# Patient Record
Sex: MALE | Race: WHITE | NOT HISPANIC OR LATINO | Employment: OTHER | URBAN - METROPOLITAN AREA
[De-identification: names, ages, dates, MRNs, and addresses within clinical notes are randomized per-mention and may not be internally consistent; named-entity substitution may affect disease eponyms.]

---

## 2017-06-09 ENCOUNTER — APPOINTMENT (OUTPATIENT)
Dept: GENERAL RADIOLOGY | Facility: HOSPITAL | Age: 52
End: 2017-06-09

## 2017-06-09 ENCOUNTER — HOSPITAL ENCOUNTER (EMERGENCY)
Facility: HOSPITAL | Age: 52
Discharge: HOME | End: 2017-06-09
Attending: EMERGENCY MEDICINE | Admitting: EMERGENCY MEDICINE

## 2017-06-09 VITALS
WEIGHT: 186 LBS | RESPIRATION RATE: 16 BRPM | HEART RATE: 83 BPM | OXYGEN SATURATION: 100 % | DIASTOLIC BLOOD PRESSURE: 74 MMHG | BODY MASS INDEX: 26.63 KG/M2 | HEIGHT: 70 IN | SYSTOLIC BLOOD PRESSURE: 135 MMHG | TEMPERATURE: 98.3 F

## 2017-06-09 DIAGNOSIS — S40.012A CONTUSION SHOULDER/ARM, LEFT, INITIAL ENCOUNTER: ICD-10-CM

## 2017-06-09 DIAGNOSIS — IMO0001 ELEVATED BLOOD PRESSURE: ICD-10-CM

## 2017-06-09 DIAGNOSIS — S60.212A CONTUSION OF WRIST, LEFT: ICD-10-CM
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
## 2017-06-09 DIAGNOSIS — S46.812A SUPRASPINATUS SPRAIN, LEFT, INITIAL ENCOUNTER: ICD-10-CM

## 2017-06-09 DIAGNOSIS — S40.022A CONTUSION SHOULDER/ARM, LEFT, INITIAL ENCOUNTER: ICD-10-CM

## 2017-06-09 PROCEDURE — 73030 X-RAY EXAM OF SHOULDER: CPT

## 2017-06-09 PROCEDURE — 99283 EMERGENCY DEPT VISIT LOW MDM: CPT

## 2017-06-09 PROCEDURE — 73110 X-RAY EXAM OF WRIST: CPT

## 2017-06-09 RX ORDER — TRAMADOL HYDROCHLORIDE 50 MG/1
100 TABLET ORAL ONCE
Status: DISCONTINUED | OUTPATIENT
Start: 2017-06-09 | End: 2017-06-10 | Stop reason: HOSPADM

## 2017-06-09 RX ORDER — NAPROXEN 250 MG/1
500 TABLET ORAL ONCE
Status: COMPLETED | OUTPATIENT
Start: 2017-06-09 | End: 2017-06-09

## 2017-06-09 RX ORDER — TRAMADOL HYDROCHLORIDE 50 MG/1
50 TABLET ORAL EVERY 4 HOURS PRN
Qty: 20 TABLET | Refills: 0 | Status: SHIPPED | OUTPATIENT
Start: 2017-06-09 | End: 2018-08-20

## 2017-06-09 RX ORDER — NAPROXEN 500 MG/1
500 TABLET ORAL 2 TIMES DAILY WITH MEALS
Qty: 14 TABLET | Refills: 0 | Status: SHIPPED | OUTPATIENT
Start: 2017-06-09 | End: 2018-08-20

## 2017-06-09 RX ADMIN — NAPROXEN 500 MG: 250 TABLET ORAL at 22:42

## 2017-06-10 NOTE — ED PROVIDER NOTES
Subjective   HPI Comments: Stan Nguyễn is a 52 y.o. male with a hx of DM who presents to the ED s/p fall. Tonight he was riding a hover board when it went out from under him, causing him to fall back. He caught himself with his outstretched left arm and has since had left wrist and shoulder pain. He denies any LOC but directly after he felt lightheaded and had to sit for 20 minutes. He denies any headache, back or neck pain, or any other symptoms. He reports nothing else acute at this time.     Patient is a 52 y.o. male presenting with fall.   History provided by:  Patient  Fall   Mechanism of injury: fall    Injury location:  Shoulder/arm  Shoulder/arm injury location:  L shoulder and L wrist  Incident location:  Home  Arrived directly from scene: no    Fall:     Fall occurred: hoverboard.    Impact surface:  Ball    Point of impact: outstretched left arm.    Entrapped after fall: no    Suspicion of alcohol use: no    Suspicion of drug use: no    Prior to arrival data:     Bystander interventions:  None    Patient ambulatory at scene: yes      Blood loss:  None    Responsiveness at scene:  Alert    Orientation at scene:  Person, place, situation and time    Loss of consciousness: no      Amnesic to event: no    Associated symptoms: no back pain, no loss of consciousness and no neck pain        Review of Systems   Musculoskeletal: Negative for back pain and neck pain.        Left shoulder and wrist pain.   Neurological: Negative for loss of consciousness.       History reviewed. No pertinent past medical history.    No Known Allergies    No past surgical history on file.    History reviewed. No pertinent family history.    Social History     Social History   • Marital status:      Spouse name: N/A   • Number of children: N/A   • Years of education: N/A     Social History Main Topics   • Smoking status: None   • Smokeless tobacco: None   • Alcohol use None   • Drug use: None   • Sexual activity: Not  Asked     Other Topics Concern   • None     Social History Narrative   • None         Objective   Physical Exam   Constitutional: He is oriented to person, place, and time. He appears well-developed and well-nourished. No distress.   HENT:   Head: Normocephalic and atraumatic.   Nose: Nose normal.   Eyes: Conjunctivae and EOM are normal. No scleral icterus.   Neck: Normal range of motion. Neck supple.   Cardiovascular: Normal rate, regular rhythm, normal heart sounds and intact distal pulses.    Pulses:       Radial pulses are 2+ on the right side, and 2+ on the left side.   Pulmonary/Chest: Effort normal and breath sounds normal. No respiratory distress. He has no wheezes.   Musculoskeletal: Normal range of motion. He exhibits tenderness. He exhibits no edema.   No edema or erythema to the left shoulder, upper arm, or elbow. Mild edema to the first and fifth metacarpal area of the palmar aspect of the left hand. Moderate tenderness to the anterior aspect of the humeral head. No supraclavicular or scapular tenderness. No thoracic or cervical tenderness.  Mild left wrist tenderness. Diffuse tenderness to the palmar aspect of the left hand. Passive ROM of the LUE unable to be preformed secondary to pain.    In terms of the shoulder, the area of maximum tenderness is in the region of the supraspinatus muscle.  There is no palpable deformity or evidence of dislocation on exam.  Patient is neurovascularly intact.   Neurological: He is alert and oriented to person, place, and time.   Motor and sensory function intact and equal bilaterally.    Skin: Skin is warm and dry. He is not diaphoretic.   Psychiatric: He has a normal mood and affect. His behavior is normal.   Nursing note and vitals reviewed.      Procedures         ED Course  ED Course   Comment By Time   Neck no obvious acute fractures or dislocations noted.  Findings consistent with fall with contusions and sprain.  I did advise these are preliminary reports and  "the radiologist will have a final read tomorrow and we will call if there is any significant findings.  We will discharge the patient was symptomatically management and sling to follow-up with orthopedics in one week if any symptoms persist.  The patient was understanding and agrees. Conrad Sanford MD 06/09 2230                 No results found for this or any previous visit (from the past 24 hour(s)).  Note: In addition to lab results from this visit, the labs listed above may include labs taken at another facility or during a different encounter within the last 24 hours. Please correlate lab times with ED admission and discharge times for further clarification of the services performed during this visit.    XR Wrist 3+ View Left   ED Interpretation   No obvious displaced fracture or dislocation noted on 3 views of   the wrist.      XR Shoulder 2+ View Left   ED Interpretation   No obvious displaced acute fracture or dislocation.  2 views left   shoulder.        Vitals:    06/09/17 2132   BP: 135/74   Pulse: 83   Resp: 16   Temp: 98.3 °F (36.8 °C)   SpO2: 100%   Weight: 186 lb (84.4 kg)   Height: 70\" (177.8 cm)     Medications   traMADol (ULTRAM) tablet 100 mg (100 mg Oral Not Given 6/9/17 2245)   naproxen (NAPROSYN) tablet 500 mg (500 mg Oral Given 6/9/17 2242)     ECG/EMG Results (last 24 hours)     ** No results found for the last 24 hours. **            MDM  Number of Diagnoses or Management Options  Contusion of wrist, left:   Contusion shoulder/arm, left, initial encounter:   Elevated blood pressure:   Fall (on)(from) sidewalk curb, initial encounter:      Amount and/or Complexity of Data Reviewed  Tests in the radiology section of CPT®: reviewed  Independent visualization of images, tracings, or specimens: yes        Final diagnoses:   Fall (on)(from) sidewalk curb, initial encounter   Contusion shoulder/arm, left, initial encounter   Contusion of wrist, left   Elevated blood pressure   Supraspinatus " sprain, left, initial encounter       Documentation assistance provided by osiel Dorsey.  Information recorded by the scribe was done at my direction and has been verified and validated by me.     Samantha Dorsey  06/09/17 5532       Conrad Sanford MD  06/09/17 6070

## 2017-10-30 ENCOUNTER — TRANSCRIBE ORDERS (OUTPATIENT)
Dept: ADMINISTRATIVE | Facility: HOSPITAL | Age: 52
End: 2017-10-30

## 2017-10-30 ENCOUNTER — HOSPITAL ENCOUNTER (OUTPATIENT)
Dept: GENERAL RADIOLOGY | Facility: HOSPITAL | Age: 52
Discharge: HOME | End: 2017-10-30
Attending: ORTHOPAEDIC SURGERY | Admitting: ORTHOPAEDIC SURGERY

## 2017-10-30 ENCOUNTER — APPOINTMENT (OUTPATIENT)
Dept: LAB | Facility: HOSPITAL | Age: 52
End: 2017-10-30

## 2017-10-30 ENCOUNTER — TRANSCRIBE ORDERS (OUTPATIENT)
Dept: LAB | Facility: HOSPITAL | Age: 52
End: 2017-10-30

## 2017-10-30 DIAGNOSIS — R73.09 OTHER ABNORMAL GLUCOSE: ICD-10-CM

## 2017-10-30 DIAGNOSIS — Z01.818 PRE-OP EXAMINATION: Primary | ICD-10-CM

## 2017-10-30 DIAGNOSIS — Z01.818 PREOP EXAMINATION: Primary | ICD-10-CM

## 2017-10-30 DIAGNOSIS — Z87.68 PERSONAL HISTORY OF PERINATAL PROBLEMS: ICD-10-CM

## 2017-10-30 LAB
ANION GAP SERPL CALCULATED.3IONS-SCNC: 13 MMOL/L (ref 3–11)
BUN BLD-MCNC: 15 MG/DL (ref 9–23)
BUN/CREAT SERPL: 15 (ref 7–25)
CALCIUM SPEC-SCNC: 9.1 MG/DL (ref 8.7–10.4)
CHLORIDE SERPL-SCNC: 105 MMOL/L (ref 99–109)
CO2 SERPL-SCNC: 25 MMOL/L (ref 20–31)
CREAT BLD-MCNC: 1 MG/DL (ref 0.6–1.3)
DEPRECATED RDW RBC AUTO: 45.7 FL (ref 37–54)
ERYTHROCYTE [DISTWIDTH] IN BLOOD BY AUTOMATED COUNT: 13.5 % (ref 11.3–14.5)
GFR SERPL CREATININE-BSD FRML MDRD: 78 ML/MIN/1.73
GLUCOSE BLD-MCNC: 61 MG/DL (ref 70–100)
HBA1C MFR BLD: 5.5 % (ref 4.8–5.6)
HCT VFR BLD AUTO: 43.4 % (ref 38.9–50.9)
HGB BLD-MCNC: 14 G/DL (ref 13.1–17.5)
MCH RBC QN AUTO: 29.9 PG (ref 27–31)
MCHC RBC AUTO-ENTMCNC: 32.3 G/DL (ref 32–36)
MCV RBC AUTO: 92.5 FL (ref 80–99)
PLATELET # BLD AUTO: 209 10*3/MM3 (ref 150–450)
PMV BLD AUTO: 10.8 FL (ref 6–12)
POTASSIUM BLD-SCNC: 3.6 MMOL/L (ref 3.5–5.5)
RBC # BLD AUTO: 4.69 10*6/MM3 (ref 4.2–5.76)
SODIUM BLD-SCNC: 143 MMOL/L (ref 132–146)
WBC NRBC COR # BLD: 6.92 10*3/MM3 (ref 3.5–10.8)

## 2017-10-30 PROCEDURE — 85027 COMPLETE CBC AUTOMATED: CPT | Performed by: ORTHOPAEDIC SURGERY

## 2017-10-30 PROCEDURE — 93010 ELECTROCARDIOGRAM REPORT: CPT | Performed by: INTERNAL MEDICINE

## 2017-10-30 PROCEDURE — 71020 HC CHEST PA AND LATERAL: CPT

## 2017-10-30 PROCEDURE — 36415 COLL VENOUS BLD VENIPUNCTURE: CPT | Performed by: ORTHOPAEDIC SURGERY

## 2017-10-30 PROCEDURE — 83036 HEMOGLOBIN GLYCOSYLATED A1C: CPT | Performed by: ORTHOPAEDIC SURGERY

## 2017-10-30 PROCEDURE — 80048 BASIC METABOLIC PNL TOTAL CA: CPT | Performed by: ORTHOPAEDIC SURGERY

## 2017-10-30 PROCEDURE — 93005 ELECTROCARDIOGRAM TRACING: CPT | Performed by: ORTHOPAEDIC SURGERY

## 2017-12-21 ENCOUNTER — TREATMENT (OUTPATIENT)
Dept: PHYSICAL THERAPY | Facility: CLINIC | Age: 52
End: 2017-12-21

## 2017-12-21 ENCOUNTER — TRANSCRIBE ORDERS (OUTPATIENT)
Dept: PHYSICAL THERAPY | Facility: CLINIC | Age: 52
End: 2017-12-21

## 2017-12-21 DIAGNOSIS — S46.219A BICEPS TENDON TEAR: ICD-10-CM

## 2017-12-21 DIAGNOSIS — M75.102 TEAR OF LEFT ROTATOR CUFF, UNSPECIFIED TEAR EXTENT: Primary | ICD-10-CM

## 2017-12-21 DIAGNOSIS — M75.122 COMPLETE ROTATOR CUFF TEAR OF LEFT SHOULDER: Primary | ICD-10-CM

## 2017-12-21 DIAGNOSIS — Z47.89 ORTHOPEDIC AFTERCARE: ICD-10-CM

## 2017-12-21 DIAGNOSIS — M25.512 LEFT SHOULDER PAIN, UNSPECIFIED CHRONICITY: ICD-10-CM

## 2017-12-21 PROCEDURE — PTSP1 PR CUSTOM PT EVALUTATION & TREATMENT OF SELF-PAY PT 1ST VISIT: Performed by: PHYSICAL THERAPIST

## 2017-12-21 NOTE — PROGRESS NOTES
Physical Therapy Initial Evaluation and Plan of Care        Subjective Evaluation    History of Present Illness  Date of surgery: 2017  Mechanism of injury: Years of lifting in the gym caused some shoulder pain.  He fell from a mcmahon board on an extended left shoulder.  This resulted in a tear of bicep and large tears in the left RTC.  He was in sling/immobilizers until this week, allowed to start weaning out of it.    OTHER: Active in tennis, kayaking, mountain biking, lifting     Subjective comment: Left Shoulder pain  Patient Occupation: Psycologist Quality of life: good    Pain  Current pain ratin  At best pain ratin  At worst pain ratin  Location: Intermittent left shoulder pain to elbow.  Quality: dull ache, throbbing, tight, squeezing and sharp  Relieving factors: rest, support and ice  Aggravating factors: overhead activity and movement  Progression: improved    Hand dominance: right             Objective       Postural Observations  Seated posture: good  Standing posture: good        Observations   Left Shoulder   Positive for adhesive scar and atrophy.     Additional Observation Details  Mild to moderate left scapular winging.  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    Tenderness     Left Shoulder   Tenderness in the acromion and biceps tendon (proximal).     Cervical/Thoracic Screen   Cervical range of motion within normal limits  Thoracic range of motion within normal limits    Neurological Testing   Sensation     Shoulder   Left Shoulder   Intact: light touch, pin prick and sharp/dull discrimination    Active Range of Motion   Left Shoulder   Flexion: 76 degrees   Extension: 36 degrees   Abduction: 82 degrees     Passive Range of Motion   Left Shoulder   Flexion: 85 degrees   Abduction: 90 degrees   External rotation 90°: 25 degrees   Internal rotation 90°: 35 degrees     Joint Play   Left Shoulder  Hypomobile in the anterior capsule, posterior capsule and  inferior capsule.    Strength/Myotome Testing     Left Shoulder     Planes of Motion   Flexion: 2+   Abduction: 3-   External rotation at 0°: 3   Internal rotation at 0°: 4-     Isolated Muscles   Biceps: 4   Triceps: 5          Assessment & Plan     Assessment  Impairments: abnormal muscle tone, abnormal or restricted ROM, activity intolerance, impaired physical strength, lacks appropriate home exercise program and pain with function  Assessment details: 54 Year old male who is S/P left RTC repair of a large tear and bicep tenodesis on 11/06/2017 from a fall in June 2017.  He is doing well post surgically.  Neurological exam is normal today.  Problems: pain, decreased ROM, decreased strength, decreased function, and severe shoulder girdle atrophy.  Prognosis: good  Prognosis details:   GOALS  STG to be met in 4 weeks  1.  Pt reports highest pain rating of 5/10  2.  Increase PROM of shoulder flexion to 145 degrees.  3.  Increase PROM of shoulder external rotation to 60 degrees.  4.  Pt is able to stabilize the left shoulder girdle to prevent winging.  LTG to be met in 12 weeks  1.  Pt is independent with HEP.  2.  Pt has full functional AROM of left shoulder in all planes.  3.  Pt is able to sleep through the night without being awaken by pain.  4.  Pt left shoulder girdle strength is functionally at 4+/5    Functional Limitations: carrying objects, lifting, sleeping, pulling, pushing, reaching behind back, reaching overhead and unable to perform repetitive tasks  Plan  Therapy options: will be seen for skilled physical therapy services  Planned modality interventions: cryotherapy, high voltage pulsed current (pain management), high voltage pulsed current (spasm management), iontophoresis and ultrasound  Planned therapy interventions: abdominal trunk stabilization, manual therapy, neuromuscular re-education, body mechanics training, flexibility, functional ROM exercises, home exercise program, joint mobilization,  therapeutic activities, stretching, strengthening, soft tissue mobilization and postural training  Frequency: 2x week  Duration in weeks: 12        Manual Therapy:    10     mins  10430;  Therapeutic Exercise:    10     mins  92950;     Neuromuscular Annie:        mins  73089;    Therapeutic Activity:          mins  99664;     Gait Training:          mins  26605;     Ultrasound:          mins  17613;    Electrical Stimulation:         mins  30413 ( );  Dry Needling          mins self-pay    Timed Treatment:   20   mins   Total Treatment:     40   mins    PT SIGNATURE: Manav Dickens, PT   DATE TREATMENT INITIATED: 12/21/2017    Initial Certification  Certification Period: 3/21/2018  I certify that the therapy services are furnished while this patient is under my care.  The services outlined above are required by this patient, and will be reviewed every 90 days.     PHYSICIAN: Benoit Del Cid MD      DATE:     Please sign and return via fax to [phone]. Thank you, [facility].

## 2017-12-26 ENCOUNTER — TREATMENT (OUTPATIENT)
Dept: PHYSICAL THERAPY | Facility: CLINIC | Age: 52
End: 2017-12-26

## 2017-12-26 DIAGNOSIS — Z47.89 ORTHOPEDIC AFTERCARE: ICD-10-CM

## 2017-12-26 DIAGNOSIS — M25.512 LEFT SHOULDER PAIN, UNSPECIFIED CHRONICITY: ICD-10-CM

## 2017-12-26 DIAGNOSIS — M75.102 TEAR OF LEFT ROTATOR CUFF, UNSPECIFIED TEAR EXTENT: Primary | ICD-10-CM

## 2017-12-26 DIAGNOSIS — S46.219A BICEPS TENDON TEAR: ICD-10-CM

## 2017-12-26 PROCEDURE — PTSPVT PR CUSTOM PT TREATMENT OF SELF PAY PATIENT: Performed by: PHYSICAL THERAPIST

## 2017-12-27 NOTE — PROGRESS NOTES
Subjective   Irving Nguyễn reports: HEP seems to be helping some    Objective   PROM LEFT SHOULDER:  Flexion 135, Abduction 110, ER 55, IR 50  OBSERVATION:  Atrophy left GH joint  See Exercise, Manual, and Modality Logs for complete treatment.       Assessment/Plan  Improvement in PROM.  Pain seems to be decreased.  Progress per Plan of Care           Manual Therapy:    20     mins  03069;  Therapeutic Exercise:    25     mins  88218;     Neuromuscular Annie:        mins  35307;    Therapeutic Activity:          mins  70657;     Gait Training:           mins  08245;     Ultrasound:          mins  23377;   Iontophoresis          mins  57298   Electrical Stimulation:    20     mins  46203 ( );  Dry Needling          mins self-pay  Fluidotherapy          mins 63709    Timed Treatment:   45   mins   Total Treatment:     65   mins    Manav Dickens, PT  Physical Therapist

## 2018-01-02 ENCOUNTER — TREATMENT (OUTPATIENT)
Dept: PHYSICAL THERAPY | Facility: CLINIC | Age: 53
End: 2018-01-02

## 2018-01-02 DIAGNOSIS — M75.102 TEAR OF LEFT ROTATOR CUFF, UNSPECIFIED TEAR EXTENT: Primary | ICD-10-CM

## 2018-01-02 DIAGNOSIS — Z47.89 ORTHOPEDIC AFTERCARE: ICD-10-CM

## 2018-01-02 DIAGNOSIS — M25.512 LEFT SHOULDER PAIN, UNSPECIFIED CHRONICITY: ICD-10-CM

## 2018-01-02 DIAGNOSIS — S46.219A BICEPS TENDON TEAR: ICD-10-CM

## 2018-01-02 PROCEDURE — PTSPVT PR CUSTOM PT TREATMENT OF SELF PAY PATIENT: Performed by: PHYSICAL THERAPIST

## 2018-01-02 NOTE — PROGRESS NOTES
Physical Therapy Daily Progress Note        Patient: Irving Nguyễn II   : 1965  Diagnosis/ICD-10 Code:  Tear of left rotator cuff, unspecified tear extent [M75.102]  Referring practitioner: Benoit Del Cid MD  Date of Initial Visit: Type: THERAPY  Noted: 2017  Today's Date: 2018  Patient seen for 3 sessions           Subjective   Irving Nguyễn reports: Shoulder is feeling some better.  Working on HEP about 45 minutes a day.    Objective       Observations   Left Shoulder   Positive for atrophy.     Active Range of Motion   Left Shoulder   Flexion: 105 degrees with pain  Extension: 50 degrees   Abduction: 94 degrees with pain    Passive Range of Motion   Left Shoulder   Flexion: 145 degrees   Abduction: 120 degrees   External rotation 90°: 45 degrees   Internal rotation 90°: 40 degrees        See Exercise, Manual, and Modality Logs for complete treatment.       Assessment/Plan  AROM and PROM is improving.  Still very weak.  Progress per Plan of Care and Progress strengthening /stabilization /functional activity           Manual Therapy:    30     mins  33346;  Therapeutic Exercise:    10     mins  44221;     Neuromuscular Annie:        mins  94656;    Therapeutic Activity:          mins  36410;     Gait Training:           mins  60432;     Ultrasound:          mins  01941;    Electrical Stimulation:    20     mins  52762 ( );  Fluidotherapy:          mins  58504  Traction:          mins  33195  Dry Needling          mins self-pay    Timed Treatment:   40   mins   Total Treatment:     60   mins    Manav Dickens PT  Physical Therapist

## 2018-01-09 ENCOUNTER — TREATMENT (OUTPATIENT)
Dept: PHYSICAL THERAPY | Facility: CLINIC | Age: 53
End: 2018-01-09

## 2018-01-09 DIAGNOSIS — S46.219A BICEPS TENDON TEAR: ICD-10-CM

## 2018-01-09 DIAGNOSIS — M75.102 TEAR OF LEFT ROTATOR CUFF, UNSPECIFIED TEAR EXTENT: Primary | ICD-10-CM

## 2018-01-09 DIAGNOSIS — M25.512 LEFT SHOULDER PAIN, UNSPECIFIED CHRONICITY: ICD-10-CM

## 2018-01-09 DIAGNOSIS — Z47.89 ORTHOPEDIC AFTERCARE: ICD-10-CM

## 2018-01-09 PROCEDURE — PTSPVT PR CUSTOM PT TREATMENT OF SELF PAY PATIENT: Performed by: PHYSICAL THERAPIST

## 2018-01-12 NOTE — PROGRESS NOTES
Physical Therapy Daily Progress Note        Patient: Irving Nguyễn II   : 1965  Diagnosis/ICD-10 Code:  Tear of left rotator cuff, unspecified tear extent [M75.102]  Referring practitioner: Benoit Del Cid MD  Date of Initial Visit: Type: THERAPY  Noted: 2017  Today's Date: 2018  Patient seen for 4 sessions           Subjective   Irving Nguyễn reports: Shoulder is feeling some better.  Breaking up HEP though out the day has been helpful    Objective       Observations   Left Shoulder   Positive for atrophy.     Active Range of Motion   Left Shoulder   Flexion: 105 degrees with pain  Extension: 50 degrees   Abduction: 94 degrees with pain    Passive Range of Motion   Left Shoulder   Flexion: 145 degrees   Abduction: 120 degrees   External rotation 90°: 45 degrees   Internal rotation 90°: 40 degrees        See Exercise, Manual, and Modality Logs for complete treatment.       Assessment/Plan  AROM and PROM is improving.  Still very weak.  Progress per Plan of Care and Progress strengthening /stabilization /functional activity           Manual Therapy:    20     mins  79241;  Therapeutic Exercise:    10     mins  83946;     Neuromuscular Annie:        mins  36657;    Therapeutic Activity:          mins  92219;     Gait Training:           mins  67641;     Ultrasound:          mins  18996;    Electrical Stimulation:    20     mins  67998 ( );  Fluidotherapy:          mins  23607  Traction:          mins  54143  Dry Needling          mins self-pay    Timed Treatment:   30   mins   Total Treatment:     50   mins    Manav Dickens PT  Physical Therapist

## 2018-01-16 ENCOUNTER — TREATMENT (OUTPATIENT)
Dept: PHYSICAL THERAPY | Facility: CLINIC | Age: 53
End: 2018-01-16

## 2018-01-16 DIAGNOSIS — M25.512 LEFT SHOULDER PAIN, UNSPECIFIED CHRONICITY: ICD-10-CM

## 2018-01-16 DIAGNOSIS — Z47.89 ORTHOPEDIC AFTERCARE: ICD-10-CM

## 2018-01-16 DIAGNOSIS — S46.219A BICEPS TENDON TEAR: ICD-10-CM

## 2018-01-16 DIAGNOSIS — M75.102 TEAR OF LEFT ROTATOR CUFF, UNSPECIFIED TEAR EXTENT: Primary | ICD-10-CM

## 2018-01-16 PROCEDURE — PTSPVT PR CUSTOM PT TREATMENT OF SELF PAY PATIENT: Performed by: PHYSICAL THERAPIST

## 2018-01-17 NOTE — PROGRESS NOTES
Subjective   Irving Nguyễn reports: Breaking up the HEP through out the day has helped a lot. Resting better    Objective   PROM: Left shoulder flexion 150, abduction 145, ER 55 ERP    See Exercise, Manual, and Modality Logs for complete treatment.       Assessment/Plan  PROM continues to improve  Progress per Plan of Care           Manual Therapy:    20     mins  73663;  Therapeutic Exercise:    30     mins  25829;     Neuromuscular Annie:        mins  42174;    Therapeutic Activity:          mins  70229;     Gait Training:           mins  39446;     Ultrasound:          mins  02682;   Iontophoresis          mins  91356   Electrical Stimulation:         mins  92054 ( );  Dry Needling          mins self-pay  Fluidotherapy          mins 97494    Timed Treatment:   50   mins   Total Treatment:     50   mins    Manav Dickens, PT  Physical Therapist

## 2018-01-23 ENCOUNTER — TREATMENT (OUTPATIENT)
Dept: PHYSICAL THERAPY | Facility: CLINIC | Age: 53
End: 2018-01-23

## 2018-01-23 DIAGNOSIS — Z47.89 ORTHOPEDIC AFTERCARE: ICD-10-CM

## 2018-01-23 DIAGNOSIS — S46.219A BICEPS TENDON TEAR: ICD-10-CM

## 2018-01-23 DIAGNOSIS — M75.102 TEAR OF LEFT ROTATOR CUFF, UNSPECIFIED TEAR EXTENT: Primary | ICD-10-CM

## 2018-01-23 PROCEDURE — PTSPVT PR CUSTOM PT TREATMENT OF SELF PAY PATIENT: Performed by: PHYSICAL THERAPIST

## 2018-01-24 NOTE — PROGRESS NOTES
Subjective   Irving Nguyễn reports: the left shoulder has been sore for about 3 days, today not as bad.    Objective   PROM: Left shoulder flexion 148, abduction 140, ER 65 ERP    See Exercise, Manual, and Modality Logs for complete treatment.       Assessment/Plan  Pt shoulder may need to rest for a couple of day.  Progress per Plan of Care Instructed pt to only work on arm pulley 2 x a day for 2 days and let shoulder rest.           Manual Therapy:    20     mins  71578;  Therapeutic Exercise:    12     mins  97577;     Neuromuscular Annie:        mins  30582;    Therapeutic Activity:          mins  93916;     Gait Training:           mins  93826;     Ultrasound:          mins  13968;   Iontophoresis          mins  29526   Electrical Stimulation:    20     mins  27330 ( );  Dry Needling          mins self-pay  Fluidotherapy          mins 69893    Timed Treatment:   50   mins   Total Treatment:     50   mins    Manav Dickens, PT  Physical Therapist

## 2018-02-06 ENCOUNTER — TREATMENT (OUTPATIENT)
Dept: PHYSICAL THERAPY | Facility: CLINIC | Age: 53
End: 2018-02-06

## 2018-02-06 DIAGNOSIS — M25.512 LEFT SHOULDER PAIN, UNSPECIFIED CHRONICITY: ICD-10-CM

## 2018-02-06 DIAGNOSIS — S46.219A BICEPS TENDON TEAR: ICD-10-CM

## 2018-02-06 DIAGNOSIS — Z47.89 ORTHOPEDIC AFTERCARE: ICD-10-CM

## 2018-02-06 DIAGNOSIS — M75.102 TEAR OF LEFT ROTATOR CUFF, UNSPECIFIED TEAR EXTENT: Primary | ICD-10-CM

## 2018-02-06 PROCEDURE — PTSPVT PR CUSTOM PT TREATMENT OF SELF PAY PATIENT: Performed by: PHYSICAL THERAPIST

## 2018-02-08 NOTE — PROGRESS NOTES
Subjective   Irving Nguyễn reports: the left shoulder has been feeling some better.  Working on HEP.    Objective   PROM: Left shoulder flexion 155, abduction 145, ER 68 ERP  STRENGTH:  Left shoulder flexion 3+/5, Abduction 3+/5, ER 3+/5, IR 4/5    See Exercise, Manual, and Modality Logs for complete treatment.       Assessment/Plan  S/P large RTC repair, progressing as expected or better.  Progress per Plan of Care and Progress strengthening /stabilization /functional activity            Manual Therapy:    15     mins  05548;  Therapeutic Exercise:    25     mins  07169;     Neuromuscular Annie:        mins  11390;    Therapeutic Activity:          mins  34647;     Gait Training:           mins  32939;     Ultrasound:          mins  57147;   Iontophoresis          mins  80014   Electrical Stimulation:         mins  00618 ( );  Dry Needling          mins self-pay  Fluidotherapy          mins 53533    Timed Treatment:   40   mins   Total Treatment:     50   mins    Manav Dickens, PT  Physical Therapist

## 2018-02-13 ENCOUNTER — TREATMENT (OUTPATIENT)
Dept: PHYSICAL THERAPY | Facility: CLINIC | Age: 53
End: 2018-02-13

## 2018-02-13 DIAGNOSIS — S46.219A BICEPS TENDON TEAR: ICD-10-CM

## 2018-02-13 DIAGNOSIS — Z47.89 ORTHOPEDIC AFTERCARE: ICD-10-CM

## 2018-02-13 DIAGNOSIS — M75.102 TEAR OF LEFT ROTATOR CUFF, UNSPECIFIED TEAR EXTENT: Primary | ICD-10-CM

## 2018-02-13 DIAGNOSIS — M25.512 LEFT SHOULDER PAIN, UNSPECIFIED CHRONICITY: ICD-10-CM

## 2018-02-13 PROCEDURE — PTSPVT PR CUSTOM PT TREATMENT OF SELF PAY PATIENT: Performed by: PHYSICAL THERAPIST

## 2018-02-14 NOTE — PROGRESS NOTES
Subjective   Irving Nguyễn reports:the shoulder is weak, but does feel better.    Objective   PROM: Left shoulder flexion 155, abduction 145, ER 68 ERP  AROM: Left shoulder Flexion 130, abduction 110  STRENGTH:  Left shoulder flexion 3+/5, Abduction 3+/5, ER 3+/5, IR 4/5    See Exercise, Manual, and Modality Logs for complete treatment.       Assessment/Plan  S/P large RTC repair, progressing as expected or better.  Progress per Plan of Care and Progress strengthening /stabilization /functional activity            Manual Therapy:    20     mins  75349;  Therapeutic Exercise:    25     mins  97219;     Neuromuscular Annie:        mins  97774;    Therapeutic Activity:          mins  23184;     Gait Training:           mins  39402;     Ultrasound:          mins  85398;   Iontophoresis          mins  33988   Electrical Stimulation:         mins  55315 ( );  Dry Needling          mins self-pay  Fluidotherapy          mins 59622    Timed Treatment:   45   mins   Total Treatment:     45   mins    Manav Dickens, PT  Physical Therapist

## 2018-02-20 ENCOUNTER — TREATMENT (OUTPATIENT)
Dept: PHYSICAL THERAPY | Facility: CLINIC | Age: 53
End: 2018-02-20

## 2018-02-20 DIAGNOSIS — M75.102 TEAR OF LEFT ROTATOR CUFF, UNSPECIFIED TEAR EXTENT: Primary | ICD-10-CM

## 2018-02-20 DIAGNOSIS — Z47.89 ORTHOPEDIC AFTERCARE: ICD-10-CM

## 2018-02-20 DIAGNOSIS — S46.219A BICEPS TENDON TEAR: ICD-10-CM

## 2018-02-20 PROCEDURE — PTSPVT PR CUSTOM PT TREATMENT OF SELF PAY PATIENT: Performed by: PHYSICAL THERAPIST

## 2018-02-21 NOTE — PROGRESS NOTES
Subjective   Irving Nguyễn reports:the shoulder is moving better  Objective   PROM: Left shoulder flexion 155, abduction 1150, ER 55 ERP  AROM: Left shoulder Flexion 148, abduction 110, ER 40   STRENGTH:  Left shoulder flexion 3+/5, Abduction 3+/5, ER 3+/5, IR 4/5    See Exercise, Manual, and Modality Logs for complete treatment.       Assessment/Plan  S/P large RTC repair, progressing as expected or better.  Progress per Plan of Care and Progress strengthening /stabilization /functional activity            Manual Therapy:    20     mins  65614;  Therapeutic Exercise:    25     mins  64618;     Neuromuscular Annie:        mins  41203;    Therapeutic Activity:          mins  43580;     Gait Training:           mins  35486;     Ultrasound:    13      mins  99323;   Iontophoresis          mins  41371   Electrical Stimulation:         mins  01281 ( );  Dry Needling          mins self-pay  Fluidotherapy          mins 51667    Timed Treatment:   58   mins   Total Treatment:     58   mins    Manav Dickens, PT  Physical Therapist

## 2018-02-27 ENCOUNTER — TREATMENT (OUTPATIENT)
Dept: PHYSICAL THERAPY | Facility: CLINIC | Age: 53
End: 2018-02-27

## 2018-02-27 DIAGNOSIS — M75.102 TEAR OF LEFT ROTATOR CUFF, UNSPECIFIED TEAR EXTENT: Primary | ICD-10-CM

## 2018-02-27 DIAGNOSIS — S46.219A BICEPS TENDON TEAR: ICD-10-CM

## 2018-02-27 DIAGNOSIS — Z47.89 ORTHOPEDIC AFTERCARE: ICD-10-CM

## 2018-02-27 PROCEDURE — PTSPVT PR CUSTOM PT TREATMENT OF SELF PAY PATIENT: Performed by: PHYSICAL THERAPIST

## 2018-02-28 NOTE — PROGRESS NOTES
Subjective   Irving Nguyễn reports:the shoulder is moving better, still having problems with ER  Objective   PROM: Left shoulder flexion 155, abduction 125, ER 75 ERP  AROM: Left shoulder Flexion 145, abduction 110, ER 45   STRENGTH:  Left shoulder flexion 3+/5, Abduction 3+/5, ER 3+/5, IR 4/5    See Exercise, Manual, and Modality Logs for complete treatment.       Assessment/Plan  S/P large RTC repair, progressing as expected or better.  Progress per Plan of Care and Progress strengthening /stabilization /functional activity            Manual Therapy:    15     mins  58561;  Therapeutic Exercise:    35     mins  13175;     Neuromuscular Annie:        mins  55538;    Therapeutic Activity:          mins  30825;     Gait Training:           mins  58331;     Ultrasound:          mins  17613;   Iontophoresis          mins  66249   Electrical Stimulation:         mins  78712 ( );  Dry Needling          mins self-pay  Fluidotherapy          mins 10630    Timed Treatment:   50   mins   Total Treatment:     50   mins    Manav Dickens, PT  Physical Therapist

## 2018-03-06 ENCOUNTER — TREATMENT (OUTPATIENT)
Dept: PHYSICAL THERAPY | Facility: CLINIC | Age: 53
End: 2018-03-06

## 2018-03-06 DIAGNOSIS — S46.219A BICEPS TENDON TEAR: ICD-10-CM

## 2018-03-06 DIAGNOSIS — M75.102 TEAR OF LEFT ROTATOR CUFF, UNSPECIFIED TEAR EXTENT: Primary | ICD-10-CM

## 2018-03-06 DIAGNOSIS — Z47.89 ORTHOPEDIC AFTERCARE: ICD-10-CM

## 2018-03-06 PROCEDURE — PTSPVT PR CUSTOM PT TREATMENT OF SELF PAY PATIENT: Performed by: PHYSICAL THERAPIST

## 2018-03-20 ENCOUNTER — TREATMENT (OUTPATIENT)
Dept: PHYSICAL THERAPY | Facility: CLINIC | Age: 53
End: 2018-03-20

## 2018-03-20 DIAGNOSIS — Z47.89 ORTHOPEDIC AFTERCARE: ICD-10-CM

## 2018-03-20 DIAGNOSIS — S46.219A BICEPS TENDON TEAR: ICD-10-CM

## 2018-03-20 DIAGNOSIS — M75.102 TEAR OF LEFT ROTATOR CUFF, UNSPECIFIED TEAR EXTENT: Primary | ICD-10-CM

## 2018-03-20 DIAGNOSIS — M25.512 LEFT SHOULDER PAIN, UNSPECIFIED CHRONICITY: ICD-10-CM

## 2018-03-20 PROCEDURE — PTSPVT PR CUSTOM PT TREATMENT OF SELF PAY PATIENT: Performed by: PHYSICAL THERAPIST

## 2018-03-23 NOTE — PROGRESS NOTES
Subjective   Irving Nguyễn reports:the shoulder is feeling a lot better  Objective   PROM: Left shoulder flexion 170, abduction 158, ER 78   AROM: Left shoulder Flexion 165, abduction 145, ER 70   STRENGTH:  Left shoulder flexion 4/5, Abduction 3+/5, ER 3+/5, IR 5/5    See Exercise, Manual, and Modality Logs for complete treatment.       Assessment/Plan  S/P large RTC repair, progressing as expected or better.  Significant improvement over the last 2 weeks.  Progress per Plan of Care and Progress strengthening /stabilization /functional activity To call and schedule before return to MD in May.           Manual Therapy:    10     mins  73064;  Therapeutic Exercise:    30     mins  65803;     Neuromuscular Annie:        mins  89072;    Therapeutic Activity:          mins  27497;     Gait Training:           mins  30232;     Ultrasound:          mins  94316;   Iontophoresis          mins  12409   Electrical Stimulation:         mins  43751 ( );  Dry Needling          mins self-pay  Fluidotherapy          mins 91882    Timed Treatment:   40 mins   Total Treatment:     40   mins    Manav Dickens, PT  Physical Therapist

## 2018-08-20 ENCOUNTER — OFFICE VISIT (OUTPATIENT)
Dept: INTERNAL MEDICINE | Facility: CLINIC | Age: 53
End: 2018-08-20

## 2018-08-20 VITALS
WEIGHT: 197.6 LBS | DIASTOLIC BLOOD PRESSURE: 72 MMHG | TEMPERATURE: 98.5 F | HEART RATE: 71 BPM | OXYGEN SATURATION: 99 % | HEIGHT: 70 IN | SYSTOLIC BLOOD PRESSURE: 120 MMHG | BODY MASS INDEX: 28.29 KG/M2

## 2018-08-20 DIAGNOSIS — H92.02 LEFT EAR PAIN: Primary | ICD-10-CM

## 2018-08-20 DIAGNOSIS — N52.9 ERECTILE DYSFUNCTION, UNSPECIFIED ERECTILE DYSFUNCTION TYPE: ICD-10-CM

## 2018-08-20 LAB
25(OH)D3 SERPL-MCNC: 40.9 NG/ML
ALBUMIN SERPL-MCNC: 4.75 G/DL (ref 3.2–4.8)
ALBUMIN/GLOB SERPL: 1.9 G/DL (ref 1.5–2.5)
ALP SERPL-CCNC: 50 U/L (ref 25–100)
ALT SERPL W P-5'-P-CCNC: 34 U/L (ref 7–40)
ANION GAP SERPL CALCULATED.3IONS-SCNC: 8 MMOL/L (ref 3–11)
AST SERPL-CCNC: 32 U/L (ref 0–33)
BASOPHILS # BLD AUTO: 0.02 10*3/MM3 (ref 0–0.2)
BASOPHILS NFR BLD AUTO: 0.3 % (ref 0–1)
BILIRUB SERPL-MCNC: 0.2 MG/DL (ref 0.3–1.2)
BUN BLD-MCNC: 13 MG/DL (ref 9–23)
BUN/CREAT SERPL: 12.9 (ref 7–25)
CALCIUM SPEC-SCNC: 9.6 MG/DL (ref 8.7–10.4)
CHLORIDE SERPL-SCNC: 103 MMOL/L (ref 99–109)
CO2 SERPL-SCNC: 29 MMOL/L (ref 20–31)
CREAT BLD-MCNC: 1.01 MG/DL (ref 0.6–1.3)
DEPRECATED RDW RBC AUTO: 42.2 FL (ref 37–54)
EOSINOPHIL # BLD AUTO: 0.06 10*3/MM3 (ref 0–0.3)
EOSINOPHIL NFR BLD AUTO: 0.9 % (ref 0–3)
ERYTHROCYTE [DISTWIDTH] IN BLOOD BY AUTOMATED COUNT: 12.9 % (ref 11.3–14.5)
GFR SERPL CREATININE-BSD FRML MDRD: 77 ML/MIN/1.73
GLOBULIN UR ELPH-MCNC: 2.5 GM/DL
GLUCOSE BLD-MCNC: 97 MG/DL (ref 70–100)
HCT VFR BLD AUTO: 43.5 % (ref 38.9–50.9)
HGB BLD-MCNC: 14.5 G/DL (ref 13.1–17.5)
IMM GRANULOCYTES # BLD: 0.01 10*3/MM3 (ref 0–0.03)
IMM GRANULOCYTES NFR BLD: 0.1 % (ref 0–0.6)
LYMPHOCYTES # BLD AUTO: 1.49 10*3/MM3 (ref 0.6–4.8)
LYMPHOCYTES NFR BLD AUTO: 21.6 % (ref 24–44)
MCH RBC QN AUTO: 30 PG (ref 27–31)
MCHC RBC AUTO-ENTMCNC: 33.3 G/DL (ref 32–36)
MCV RBC AUTO: 89.9 FL (ref 80–99)
MONOCYTES # BLD AUTO: 0.66 10*3/MM3 (ref 0–1)
MONOCYTES NFR BLD AUTO: 9.6 % (ref 0–12)
NEUTROPHILS # BLD AUTO: 4.67 10*3/MM3 (ref 1.5–8.3)
NEUTROPHILS NFR BLD AUTO: 67.6 % (ref 41–71)
PLATELET # BLD AUTO: 226 10*3/MM3 (ref 150–450)
PMV BLD AUTO: 11.2 FL (ref 6–12)
POTASSIUM BLD-SCNC: 4.4 MMOL/L (ref 3.5–5.5)
PROT SERPL-MCNC: 7.2 G/DL (ref 5.7–8.2)
RBC # BLD AUTO: 4.84 10*6/MM3 (ref 4.2–5.76)
SODIUM BLD-SCNC: 140 MMOL/L (ref 132–146)
TESTOST SERPL-MCNC: 559.12 NG/DL (ref 86.98–780.1)
TSH SERPL DL<=0.05 MIU/L-ACNC: 1.81 MIU/ML (ref 0.35–5.35)
VIT B12 BLD-MCNC: 554 PG/ML (ref 211–911)
WBC NRBC COR # BLD: 6.9 10*3/MM3 (ref 3.5–10.8)

## 2018-08-20 PROCEDURE — 84443 ASSAY THYROID STIM HORMONE: CPT | Performed by: NURSE PRACTITIONER

## 2018-08-20 PROCEDURE — 82607 VITAMIN B-12: CPT | Performed by: NURSE PRACTITIONER

## 2018-08-20 PROCEDURE — 85025 COMPLETE CBC W/AUTO DIFF WBC: CPT | Performed by: NURSE PRACTITIONER

## 2018-08-20 PROCEDURE — 84403 ASSAY OF TOTAL TESTOSTERONE: CPT | Performed by: NURSE PRACTITIONER

## 2018-08-20 PROCEDURE — 82306 VITAMIN D 25 HYDROXY: CPT | Performed by: NURSE PRACTITIONER

## 2018-08-20 PROCEDURE — 99203 OFFICE O/P NEW LOW 30 MIN: CPT | Performed by: NURSE PRACTITIONER

## 2018-08-20 PROCEDURE — 80053 COMPREHEN METABOLIC PANEL: CPT | Performed by: NURSE PRACTITIONER

## 2018-08-20 RX ORDER — BUPROPION HYDROCHLORIDE 150 MG/1
TABLET ORAL
Refills: 2 | COMMUNITY
Start: 2018-07-24 | End: 2019-01-29 | Stop reason: SDUPTHER
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
## 2018-08-22 LAB — PSA SERPL-MCNC: 0.42 NG/ML (ref 0–4)

## 2018-08-22 PROCEDURE — G0103 PSA SCREENING: HCPCS | Performed by: NURSE PRACTITIONER

## 2018-08-24 ENCOUNTER — TELEPHONE (OUTPATIENT)
Dept: INTERNAL MEDICINE | Facility: CLINIC | Age: 53
End: 2018-08-24

## 2018-08-24 NOTE — TELEPHONE ENCOUNTER
MR. MOREL WOULD LIKE A CALL BACK REGARDING HIS TEST RESULTS, STATED THAT [facility] SAYS THAT DEPENDING ON THE RESULTS SHE MAY HAVE TO SEND IN A PRESCRIPTION, IF SO WOULD YOU PLEASE SEND A GENERIC BRAND.

## 2018-08-27 NOTE — TELEPHONE ENCOUNTER
Patient has been notified. He said that he would like for you to send in the Generic Viagra for him please.     I let him know that his insurance is more that Likely not going to cover this medication and that he can pay out of pocket for a small supply if he would like to do that also. He said that would be fine.

## 2018-08-28 RX ORDER — SILDENAFIL CITRATE 20 MG/1
TABLET ORAL
Qty: 30 TABLET | Refills: 2 | Status: SHIPPED | OUTPATIENT
Start: 2018-08-28 | End: 2018-11-12 | Stop reason: SDUPTHER

## 2018-11-12 ENCOUNTER — TELEPHONE (OUTPATIENT)
Dept: INTERNAL MEDICINE | Facility: CLINIC | Age: 53
End: 2018-11-12

## 2018-11-12 RX ORDER — SILDENAFIL CITRATE 20 MG/1
TABLET ORAL
Qty: 30 TABLET | Refills: 2 | Status: SHIPPED | OUTPATIENT
Start: 2018-11-12 | End: 2019-01-29 | Stop reason: SDUPTHER

## 2018-11-12 NOTE — TELEPHONE ENCOUNTER
We rescheduled patients physical and he is not able to get back in unitl 2/25 but he will need a refill on his Sildenafil 20 mg can we call this in please.

## 2018-11-29 DIAGNOSIS — Z00.00 ROUTINE GENERAL MEDICAL EXAMINATION AT A HEALTH CARE FACILITY: Primary | ICD-10-CM

## 2019-01-16 ENCOUNTER — OFFICE VISIT (OUTPATIENT)
Dept: INTERNAL MEDICINE | Facility: CLINIC | Age: 54
End: 2019-01-16

## 2019-01-16 VITALS
OXYGEN SATURATION: 98 % | HEIGHT: 70 IN | DIASTOLIC BLOOD PRESSURE: 78 MMHG | TEMPERATURE: 98.1 F | HEART RATE: 72 BPM | SYSTOLIC BLOOD PRESSURE: 112 MMHG | BODY MASS INDEX: 29.2 KG/M2 | WEIGHT: 204 LBS

## 2019-01-16 DIAGNOSIS — J06.9 VIRAL URI: Primary | ICD-10-CM

## 2019-01-16 DIAGNOSIS — J02.9 SORE THROAT: ICD-10-CM

## 2019-01-16 LAB
EXPIRATION DATE: NORMAL
INTERNAL CONTROL: NORMAL
Lab: NORMAL
S PYO AG THROAT QL: NEGATIVE

## 2019-01-16 PROCEDURE — 87880 STREP A ASSAY W/OPTIC: CPT | Performed by: NURSE PRACTITIONER

## 2019-01-16 PROCEDURE — 99213 OFFICE O/P EST LOW 20 MIN: CPT | Performed by: NURSE PRACTITIONER

## 2019-01-16 NOTE — PROGRESS NOTES
Subjective   Irving Nguyễn II is a 53 y.o. male.     Chief Complaint   Patient presents with   • low grade fever     cold, cough, low grade fever, sore throat, burning eyes.  Taking ibuprofen, Zicam,   • Cough   • Sore Throat   • Burning Eyes     itching       URI    This is a new problem. The current episode started in the past 7 days. The problem has been unchanged. Maximum temperature: has not checked, but thought he may have had a  low grade fever  Associated symptoms include coughing (nonproductive), sneezing and a sore throat. Pertinent negatives include no abdominal pain, chest pain, congestion, diarrhea, dysuria, ear pain, headaches, joint pain, joint swelling, nausea, neck pain, plugged ear sensation, rash, rhinorrhea, sinus pain, swollen glands, vomiting or wheezing. He has tried nothing for the symptoms. The treatment provided no relief.        The following portions of the patient's history were reviewed and updated as appropriate: allergies, current medications, past family history, past medical history, past social history, past surgical history and problem list.    Review of Systems   Constitutional: Positive for fever (+/-). Negative for chills, diaphoresis and fatigue.   HENT: Positive for sneezing and sore throat. Negative for congestion, ear pain, postnasal drip, rhinorrhea, sinus pressure, sinus pain, trouble swallowing and voice change.    Eyes: Positive for itching. Negative for photophobia, pain, discharge, redness and visual disturbance.             Respiratory: Positive for cough (nonproductive). Negative for chest tightness, shortness of breath and wheezing.    Cardiovascular: Negative for chest pain.   Gastrointestinal: Negative for abdominal pain, diarrhea, nausea and vomiting.   Genitourinary: Negative for dysuria.   Musculoskeletal: Negative for joint pain and neck pain.   Skin: Negative for rash.   Neurological: Negative for headaches.       Outpatient Medications Marked as Taking for  the 1/16/19 encounter (Office Visit) with Hannah EmanuelALEISHA   Medication Sig Dispense Refill   • buPROPion XL (WELLBUTRIN XL) 150 MG 24 hr tablet TAKE 1 TABLET BY MOUTH EVERY DAY IN THE MORNING  2   • sildenafil (REVATIO) 20 MG tablet 1-2 PO Daily as needed prior to intercourse for ED 30 tablet 2       Objective   Physical Exam   Constitutional: He appears well-developed and well-nourished. No distress.   HENT:   Head: Normocephalic and atraumatic.   Right Ear: Tympanic membrane, external ear and ear canal normal.   Left Ear: Tympanic membrane, external ear and ear canal normal.   Nose: Nose normal. Right sinus exhibits no maxillary sinus tenderness and no frontal sinus tenderness. Left sinus exhibits no maxillary sinus tenderness and no frontal sinus tenderness.   Mouth/Throat: Posterior oropharyngeal erythema present. No oropharyngeal exudate or posterior oropharyngeal edema. No tonsillar exudate.   Eyes: Conjunctivae are normal. Right eye exhibits no discharge. Left eye exhibits no discharge.   Neck: Normal range of motion. Neck supple.   Cardiovascular: Normal rate, regular rhythm and normal heart sounds.   Pulmonary/Chest: Effort normal and breath sounds normal. No respiratory distress.   Lymphadenopathy:     He has no cervical adenopathy.   Skin: Skin is warm and dry. He is not diaphoretic.   Nursing note and vitals reviewed.      Vitals:    01/16/19 1233   BP: 112/78   Pulse: 72   Temp: 98.1 °F (36.7 °C)   SpO2: 98%       Assessment/Plan   Irving was seen today for low grade fever, cough, sore throat and burning eyes.    Diagnoses and all orders for this visit:    Viral URI    Sore throat  -     POCT rapid strep A       strep negative  Tylenol OTC per package instructions  OTC antihistamine-claritin  flonase OTC.   Warm salt water gargles  Increase fluids and rest  Return if symptoms worsen or fail to improve.  Plan of care discussed with pt. They verbalized understanding and agreement.

## 2019-01-29 ENCOUNTER — OFFICE VISIT (OUTPATIENT)
Dept: INTERNAL MEDICINE | Facility: CLINIC | Age: 54
End: 2019-01-29

## 2019-01-29 VITALS
BODY MASS INDEX: 28.66 KG/M2 | SYSTOLIC BLOOD PRESSURE: 120 MMHG | OXYGEN SATURATION: 98 % | DIASTOLIC BLOOD PRESSURE: 68 MMHG | WEIGHT: 200.2 LBS | RESPIRATION RATE: 16 BRPM | HEART RATE: 79 BPM | HEIGHT: 70 IN | TEMPERATURE: 97.4 F

## 2019-01-29 DIAGNOSIS — F32.A DEPRESSION, UNSPECIFIED DEPRESSION TYPE: ICD-10-CM

## 2019-01-29 DIAGNOSIS — N52.9 ERECTILE DYSFUNCTION, UNSPECIFIED ERECTILE DYSFUNCTION TYPE: ICD-10-CM

## 2019-01-29 DIAGNOSIS — Z00.00 ROUTINE GENERAL MEDICAL EXAMINATION AT A HEALTH CARE FACILITY: Primary | ICD-10-CM

## 2019-01-29 DIAGNOSIS — Z23 NEED FOR TDAP VACCINATION: ICD-10-CM

## 2019-01-29 DIAGNOSIS — Z23 NEED FOR HEPATITIS A IMMUNIZATION: ICD-10-CM

## 2019-01-29 LAB
25(OH)D3 SERPL-MCNC: 50.4 NG/ML
ALBUMIN SERPL-MCNC: 4.58 G/DL (ref 3.2–4.8)
ALBUMIN/GLOB SERPL: 2.5 G/DL (ref 1.5–2.5)
ALP SERPL-CCNC: 44 U/L (ref 25–100)
ALT SERPL W P-5'-P-CCNC: 32 U/L (ref 7–40)
ANION GAP SERPL CALCULATED.3IONS-SCNC: 3 MMOL/L (ref 3–11)
ARTICHOKE IGE QN: 195 MG/DL (ref 0–130)
AST SERPL-CCNC: 27 U/L (ref 0–33)
BASOPHILS # BLD AUTO: 0.01 10*3/MM3 (ref 0–0.2)
BASOPHILS NFR BLD AUTO: 0.2 % (ref 0–1)
BILIRUB SERPL-MCNC: 0.5 MG/DL (ref 0.3–1.2)
BUN BLD-MCNC: 20 MG/DL (ref 9–23)
BUN/CREAT SERPL: 17.5 (ref 7–25)
CALCIUM SPEC-SCNC: 9.6 MG/DL (ref 8.7–10.4)
CHLORIDE SERPL-SCNC: 103 MMOL/L (ref 99–109)
CHOLEST SERPL-MCNC: 252 MG/DL (ref 0–200)
CO2 SERPL-SCNC: 31 MMOL/L (ref 20–31)
CREAT BLD-MCNC: 1.14 MG/DL (ref 0.6–1.3)
DEPRECATED RDW RBC AUTO: 42.9 FL (ref 37–54)
EOSINOPHIL # BLD AUTO: 0.08 10*3/MM3 (ref 0–0.3)
EOSINOPHIL NFR BLD AUTO: 1.5 % (ref 0–3)
ERYTHROCYTE [DISTWIDTH] IN BLOOD BY AUTOMATED COUNT: 13.3 % (ref 11.3–14.5)
GFR SERPL CREATININE-BSD FRML MDRD: 67 ML/MIN/1.73
GLOBULIN UR ELPH-MCNC: 1.8 GM/DL
GLUCOSE BLD-MCNC: 93 MG/DL (ref 70–100)
HCT VFR BLD AUTO: 41.3 % (ref 38.9–50.9)
HDLC SERPL-MCNC: 56 MG/DL (ref 40–60)
HGB BLD-MCNC: 13.7 G/DL (ref 13.1–17.5)
IMM GRANULOCYTES # BLD AUTO: 0 10*3/MM3 (ref 0–0.03)
IMM GRANULOCYTES NFR BLD AUTO: 0 % (ref 0–0.6)
LYMPHOCYTES # BLD AUTO: 1.58 10*3/MM3 (ref 0.6–4.8)
LYMPHOCYTES NFR BLD AUTO: 30.5 % (ref 24–44)
MCH RBC QN AUTO: 29.4 PG (ref 27–31)
MCHC RBC AUTO-ENTMCNC: 33.2 G/DL (ref 32–36)
MCV RBC AUTO: 88.6 FL (ref 80–99)
MONOCYTES # BLD AUTO: 0.6 10*3/MM3 (ref 0–1)
MONOCYTES NFR BLD AUTO: 11.6 % (ref 0–12)
NEUTROPHILS # BLD AUTO: 2.91 10*3/MM3 (ref 1.5–8.3)
NEUTROPHILS NFR BLD AUTO: 56.2 % (ref 41–71)
PLATELET # BLD AUTO: 241 10*3/MM3 (ref 150–450)
PMV BLD AUTO: 11.7 FL (ref 6–12)
POTASSIUM BLD-SCNC: 4.1 MMOL/L (ref 3.5–5.5)
PROT SERPL-MCNC: 6.4 G/DL (ref 5.7–8.2)
RBC # BLD AUTO: 4.66 10*6/MM3 (ref 4.2–5.76)
SODIUM BLD-SCNC: 137 MMOL/L (ref 132–146)
TRIGL SERPL-MCNC: 97 MG/DL (ref 0–150)
TSH SERPL DL<=0.05 MIU/L-ACNC: 2.41 MIU/ML (ref 0.35–5.35)
VIT B12 BLD-MCNC: 621 PG/ML (ref 211–911)
WBC NRBC COR # BLD: 5.18 10*3/MM3 (ref 3.5–10.8)

## 2019-01-29 PROCEDURE — 85025 COMPLETE CBC W/AUTO DIFF WBC: CPT | Performed by: NURSE PRACTITIONER

## 2019-01-29 PROCEDURE — 80061 LIPID PANEL: CPT | Performed by: NURSE PRACTITIONER

## 2019-01-29 PROCEDURE — 90715 TDAP VACCINE 7 YRS/> IM: CPT | Performed by: NURSE PRACTITIONER

## 2019-01-29 PROCEDURE — 84443 ASSAY THYROID STIM HORMONE: CPT | Performed by: NURSE PRACTITIONER

## 2019-01-29 PROCEDURE — 90472 IMMUNIZATION ADMIN EACH ADD: CPT | Performed by: NURSE PRACTITIONER

## 2019-01-29 PROCEDURE — 90471 IMMUNIZATION ADMIN: CPT | Performed by: NURSE PRACTITIONER

## 2019-01-29 PROCEDURE — 90632 HEPA VACCINE ADULT IM: CPT | Performed by: NURSE PRACTITIONER

## 2019-01-29 PROCEDURE — 80053 COMPREHEN METABOLIC PANEL: CPT | Performed by: NURSE PRACTITIONER

## 2019-01-29 PROCEDURE — 82306 VITAMIN D 25 HYDROXY: CPT | Performed by: NURSE PRACTITIONER

## 2019-01-29 PROCEDURE — 99396 PREV VISIT EST AGE 40-64: CPT | Performed by: NURSE PRACTITIONER

## 2019-01-29 PROCEDURE — 82607 VITAMIN B-12: CPT | Performed by: NURSE PRACTITIONER

## 2019-01-29 RX ORDER — SILDENAFIL CITRATE 20 MG/1
TABLET ORAL
Qty: 90 TABLET | Refills: 5 | Status: SHIPPED | OUTPATIENT
Start: 2019-01-29 | End: 2020-01-21 | Stop reason: SDUPTHER

## 2019-01-29 RX ORDER — BUPROPION HYDROCHLORIDE 300 MG/1
300 TABLET ORAL DAILY
Qty: 90 TABLET | Refills: 3 | Status: SHIPPED | OUTPATIENT
Start: 2019-01-29 | End: 2020-01-21 | Stop reason: SDUPTHER

## 2019-01-29 RX ORDER — BUPROPION HYDROCHLORIDE 150 MG/1
150 TABLET ORAL EVERY MORNING
Qty: 90 TABLET | Refills: 3 | Status: SHIPPED | OUTPATIENT
Start: 2019-01-29 | End: 2019-01-29

## 2019-01-29 NOTE — PROGRESS NOTES
Subjective   Irving Nguyễn II is a 53 y.o. male    Chief Complaint   Patient presents with   • Annual Exam     History of Present Illness     Here for PE    ED - was given generic Sildenafil 20 mg (3-4 tablets PRN) at last visit and it is working well for him w/o SE    Depression - chronic/stable; seasonally affected; taking Wellbutrin 300 mg daily.  Sx's well controlled, mood better w/o SE    Tdap - today  Flu - declines  Hep A - today  Shingrix - will ck at pharmacy  PSA - 2018  Colon - declines, but agrees to cologuard, will ck with insurance first    Diet - has lost about 10 lbs since before     Exercise - weight training and doing HIIT a few times a week as well    Social History     Tobacco Use   • Smoking status: Former Smoker     Types: Cigars     Last attempt to quit: 2018     Years since quittin.4   • Smokeless tobacco: Current User   • Tobacco comment: college   Substance Use Topics   • Alcohol use: No   • Drug use: No           The following portions of the patient's history were reviewed and updated as appropriate: allergies, current medications, past family history, past medical history, past social history, past surgical history and problem list.    Current Outpatient Medications:   •  buPROPion XL (WELLBUTRIN XL) 300 MG 24 hr tablet, Take 1 tablet by mouth Daily., Disp: 90 tablet, Rfl: 3  •  sildenafil (REVATIO) 20 MG tablet, 3-4 PO Daily as needed prior to intercourse for ED, Disp: 90 tablet, Rfl: 5     Review of Systems   Constitutional: Negative for appetite change, chills, fatigue, fever and unexpected weight change.   HENT: Negative for congestion, ear pain, nosebleeds, rhinorrhea and tinnitus.    Eyes: Negative for pain.   Respiratory: Negative for cough, chest tightness and shortness of breath.    Cardiovascular: Negative for chest pain, palpitations and leg swelling.   Gastrointestinal: Negative for abdominal distention, abdominal pain, blood in stool, constipation, diarrhea, nausea  and vomiting.   Endocrine: Negative for cold intolerance, heat intolerance, polydipsia, polyphagia and polyuria.   Genitourinary: Negative for dysuria, flank pain, frequency, hematuria and urgency.   Musculoskeletal: Negative for arthralgias, back pain, gait problem, joint swelling, myalgias and neck pain.   Skin: Negative for color change, pallor, rash and wound.   Allergic/Immunologic: Negative for environmental allergies and food allergies.   Neurological: Negative for dizziness, syncope, weakness, light-headedness, numbness and headaches.   Hematological: Negative for adenopathy. Does not bruise/bleed easily.   Psychiatric/Behavioral: Negative for behavioral problems and suicidal ideas. The patient is not nervous/anxious.        Objective   Physical Exam   Constitutional: He is oriented to person, place, and time. Vital signs are normal. He appears well-developed and well-nourished.   HENT:   Head: Normocephalic and atraumatic.   Right Ear: External ear normal.   Left Ear: External ear normal.   Nose: Nose normal.   Mouth/Throat: Oropharynx is clear and moist.   Eyes: Conjunctivae, EOM and lids are normal. Pupils are equal, round, and reactive to light.   Neck: Normal range of motion. Neck supple. Carotid bruit is not present.   Cardiovascular: Normal rate, regular rhythm, normal heart sounds and intact distal pulses.   Pulmonary/Chest: Effort normal and breath sounds normal.   Abdominal: Soft. Bowel sounds are normal. There is no hepatosplenomegaly, splenomegaly or hepatomegaly. No hernia.   Musculoskeletal: Normal range of motion.   Lymphadenopathy:     He has no cervical adenopathy.   Neurological: He is alert and oriented to person, place, and time. He has normal reflexes.   Skin: Skin is warm, dry and intact.   Psychiatric: He has a normal mood and affect. His behavior is normal. Judgment and thought content normal.     Vitals:    01/29/19 1011   BP: 120/68   Pulse: 79   Resp: 16   Temp: 97.4 °F (36.3 °C)  "  TempSrc: Temporal   SpO2: 98%   Weight: 90.8 kg (200 lb 3.2 oz)   Height: 177 cm (69.69\")         Assessment/Plan   Irving was seen today for annual exam.    Diagnoses and all orders for this visit:    Routine general medical examination at a health care facility  -     CBC & Differential  -     Comprehensive Metabolic Panel  -     Lipid Panel  -     Vitamin D 25 Hydroxy  -     Vitamin B12  -     TSH  -     CBC Auto Differential    Erectile dysfunction, unspecified erectile dysfunction type    Depression, unspecified depression type  -     buPROPion XL (WELLBUTRIN XL) 300 MG 24 hr tablet; Take 1 tablet by mouth Daily.    Need for hepatitis A immunization  -     Hepatitis A Vaccine Adult IM    Need for Tdap vaccination  -     Tdap Vaccine Greater Than or Equal To 6yo IM    Other orders  -     Discontinue: buPROPion XL (WELLBUTRIN XL) 150 MG 24 hr tablet; Take 1 tablet by mouth Every Morning.  -     sildenafil (REVATIO) 20 MG tablet; 3-4 PO Daily as needed prior to intercourse for ED      Labs sent  Meds refilled  Tdap and Hep A updated  Declines flu shot  He wishes to discuss cologuard with insurance before I order  Counseling - diet and exercise - keep up the good work.   Return in about 1 year (around 1/29/2020) for Annual.           "

## 2020-01-21 ENCOUNTER — OFFICE VISIT (OUTPATIENT)
Dept: INTERNAL MEDICINE | Facility: CLINIC | Age: 55
End: 2020-01-21

## 2020-01-21 VITALS
BODY MASS INDEX: 30.18 KG/M2 | TEMPERATURE: 98.2 F | OXYGEN SATURATION: 98 % | WEIGHT: 210.8 LBS | HEIGHT: 70 IN | RESPIRATION RATE: 16 BRPM | DIASTOLIC BLOOD PRESSURE: 68 MMHG | SYSTOLIC BLOOD PRESSURE: 110 MMHG | HEART RATE: 74 BPM

## 2020-01-21 DIAGNOSIS — N52.9 ERECTILE DYSFUNCTION, UNSPECIFIED ERECTILE DYSFUNCTION TYPE: ICD-10-CM

## 2020-01-21 DIAGNOSIS — F32.9 REACTIVE DEPRESSION: Primary | ICD-10-CM

## 2020-01-21 DIAGNOSIS — E78.2 MIXED HYPERLIPIDEMIA: ICD-10-CM

## 2020-01-21 PROCEDURE — 99213 OFFICE O/P EST LOW 20 MIN: CPT | Performed by: NURSE PRACTITIONER

## 2020-01-21 RX ORDER — BUPROPION HYDROCHLORIDE 300 MG/1
300 TABLET ORAL DAILY
Qty: 90 TABLET | Refills: 3 | Status: SHIPPED | OUTPATIENT
Start: 2020-01-21 | End: 2021-01-07

## 2020-01-21 RX ORDER — SILDENAFIL CITRATE 20 MG/1
TABLET ORAL
Qty: 90 TABLET | Refills: 5 | Status: SHIPPED | OUTPATIENT
Start: 2020-01-21

## 2020-01-21 NOTE — PROGRESS NOTES
Subjective   Irving Nguyễn II is a 54 y.o. male    Chief Complaint   Patient presents with   • Follow-up   • Depression   • Erectile Dysfunction   • Med Refill     History of Present Illness     ED - was given generic Sildenafil 20 mg (3-4 tablets PRN) and it is working well for him w/o SE     Depression - chronic/stable; seasonally affected; taking Wellbutrin 300 mg daily.  Sx's well controlled, mood better w/o SE    HL - last FLP indicated an elevated LDL.  Diet and exercise was advised. Trying to cut out carbs and lose some weight currently.  Working on diet.      Tdap - 1/29/2019  Flu - declines  Hep A -  #1-1/29/2019  Shingrix - will ck at pharmacy  PSA - 8/2018  Colon - Plans on doing cologuard       The following portions of the patient's history were reviewed and updated as appropriate: allergies, current medications, past family history, past medical history, past social history, past surgical history and problem list.    Current Outpatient Medications:   •  buPROPion XL (WELLBUTRIN XL) 300 MG 24 hr tablet, Take 1 tablet by mouth Daily., Disp: 90 tablet, Rfl: 3  •  sildenafil (REVATIO) 20 MG tablet, 3-4 PO Daily as needed prior to intercourse for ED, Disp: 90 tablet, Rfl: 5     Review of Systems   Constitutional: Negative for chills, fatigue and fever.   Respiratory: Negative for cough, chest tightness and shortness of breath.    Cardiovascular: Negative for chest pain.   Gastrointestinal: Negative for abdominal pain, diarrhea, nausea and vomiting.   Endocrine: Negative for cold intolerance and heat intolerance.   Musculoskeletal: Negative for arthralgias.   Neurological: Negative for dizziness.       Objective   Physical Exam   Constitutional: He is oriented to person, place, and time. He appears well-developed and well-nourished.   HENT:   Head: Normocephalic and atraumatic.   Eyes: Pupils are equal, round, and reactive to light. Conjunctivae and EOM are normal.   Neck: Normal range of motion.  "  Cardiovascular: Normal rate, regular rhythm and normal heart sounds.   Pulmonary/Chest: Effort normal and breath sounds normal.   Abdominal: Soft. Bowel sounds are normal.   Musculoskeletal: Normal range of motion.   Neurological: He is alert and oriented to person, place, and time. He has normal reflexes.   Skin: Skin is warm and dry.   Psychiatric: He has a normal mood and affect. His behavior is normal. Judgment and thought content normal.     Vitals:    01/21/20 1501   BP: 110/68   Pulse: 74   Resp: 16   Temp: 98.2 °F (36.8 °C)   TempSrc: Temporal   SpO2: 98%   Weight: 95.6 kg (210 lb 12.8 oz)   Height: 177 cm (69.69\")         Assessment/Plan   Irving was seen today for follow-up, depression, erectile dysfunction and med refill.    Diagnoses and all orders for this visit:    Reactive depression  -     buPROPion XL (WELLBUTRIN XL) 300 MG 24 hr tablet; Take 1 tablet by mouth Daily.    Erectile dysfunction, unspecified erectile dysfunction type  -     sildenafil (REVATIO) 20 MG tablet; 3-4 PO Daily as needed prior to intercourse for ED    Mixed hyperlipidemia      Meds refilled  No labs sent today, he has a PE scheduled soon and will RTC before appt for labs  RTC for PE as scheduled           "

## 2020-01-22 DIAGNOSIS — Z12.5 SCREENING FOR PROSTATE CANCER: ICD-10-CM

## 2020-01-22 DIAGNOSIS — N52.9 ERECTILE DYSFUNCTION, UNSPECIFIED ERECTILE DYSFUNCTION TYPE: ICD-10-CM

## 2020-01-22 DIAGNOSIS — E78.2 MIXED HYPERLIPIDEMIA: ICD-10-CM

## 2020-01-22 DIAGNOSIS — Z00.00 ROUTINE GENERAL MEDICAL EXAMINATION AT A HEALTH CARE FACILITY: Primary | ICD-10-CM

## 2020-01-22 DIAGNOSIS — F32.9 REACTIVE DEPRESSION: ICD-10-CM

## 2020-01-22 DIAGNOSIS — E55.9 VITAMIN D DEFICIENCY: ICD-10-CM

## 2021-01-04 DIAGNOSIS — F32.9 REACTIVE DEPRESSION: ICD-10-CM

## 2021-01-06 NOTE — TELEPHONE ENCOUNTER
Last Office Visit: 01/21/2020  Next Office Visit: None scheduled     Labs completed in past 6 months? no  Labs completed in past year? no    Last Refill Date: 01/21/2020  Quantity: 90  Refills: 3    Pharmacy:  [address] [phone] [facility] [phone] FX

## 2021-01-07 RX ORDER — BUPROPION HYDROCHLORIDE 300 MG/1
TABLET ORAL
Qty: 30 TABLET | Refills: 0 | Status: SHIPPED | OUTPATIENT
Start: 2021-01-07 | End: 2023-01-06 | Stop reason: SDUPTHER

## 2023-01-06 ENCOUNTER — OFFICE VISIT (OUTPATIENT)
Dept: FAMILY MEDICINE CLINIC | Facility: CLINIC | Age: 58
End: 2023-01-06
Payer: COMMERCIAL

## 2023-01-06 ENCOUNTER — LAB (OUTPATIENT)
Dept: LAB | Facility: HOSPITAL | Age: 58
End: 2023-01-06
Payer: COMMERCIAL

## 2023-01-06 VITALS
DIASTOLIC BLOOD PRESSURE: 72 MMHG | HEART RATE: 98 BPM | OXYGEN SATURATION: 98 % | SYSTOLIC BLOOD PRESSURE: 130 MMHG | WEIGHT: 229.4 LBS | BODY MASS INDEX: 32.84 KG/M2 | HEIGHT: 70 IN | TEMPERATURE: 98.8 F

## 2023-01-06 DIAGNOSIS — Z12.11 SCREENING FOR COLON CANCER: ICD-10-CM

## 2023-01-06 DIAGNOSIS — F32.9 REACTIVE DEPRESSION: ICD-10-CM

## 2023-01-06 DIAGNOSIS — Z23 NEED FOR VACCINATION: ICD-10-CM

## 2023-01-06 DIAGNOSIS — Z12.5 SCREENING FOR PROSTATE CANCER: ICD-10-CM

## 2023-01-06 DIAGNOSIS — Z00.00 ANNUAL PHYSICAL EXAM: Primary | ICD-10-CM

## 2023-01-06 DIAGNOSIS — Z00.00 ANNUAL PHYSICAL EXAM: ICD-10-CM

## 2023-01-06 LAB
ALBUMIN SERPL-MCNC: 4.9 G/DL (ref 3.5–5.2)
ALBUMIN/GLOB SERPL: 1.6 G/DL
ALP SERPL-CCNC: 56 U/L (ref 39–117)
ALT SERPL W P-5'-P-CCNC: 30 U/L (ref 1–41)
ANION GAP SERPL CALCULATED.3IONS-SCNC: 12.4 MMOL/L (ref 5–15)
AST SERPL-CCNC: 27 U/L (ref 1–40)
BASOPHILS # BLD AUTO: 0.04 10*3/MM3 (ref 0–0.2)
BASOPHILS NFR BLD AUTO: 0.6 % (ref 0–1.5)
BILIRUB SERPL-MCNC: 0.2 MG/DL (ref 0–1.2)
BUN SERPL-MCNC: 20 MG/DL (ref 6–20)
BUN/CREAT SERPL: 17.1 (ref 7–25)
CALCIUM SPEC-SCNC: 10.1 MG/DL (ref 8.6–10.5)
CHLORIDE SERPL-SCNC: 100 MMOL/L (ref 98–107)
CHOLEST SERPL-MCNC: 263 MG/DL (ref 0–200)
CO2 SERPL-SCNC: 24.6 MMOL/L (ref 22–29)
CREAT SERPL-MCNC: 1.17 MG/DL (ref 0.76–1.27)
DEPRECATED RDW RBC AUTO: 43.5 FL (ref 37–54)
EGFRCR SERPLBLD CKD-EPI 2021: 72.3 ML/MIN/1.73
EOSINOPHIL # BLD AUTO: 0.09 10*3/MM3 (ref 0–0.4)
EOSINOPHIL NFR BLD AUTO: 1.3 % (ref 0.3–6.2)
ERYTHROCYTE [DISTWIDTH] IN BLOOD BY AUTOMATED COUNT: 13.8 % (ref 12.3–15.4)
GLOBULIN UR ELPH-MCNC: 3.1 GM/DL
GLUCOSE SERPL-MCNC: 85 MG/DL (ref 65–99)
HBA1C MFR BLD: 6 % (ref 4.8–5.6)
HCT VFR BLD AUTO: 42.7 % (ref 37.5–51)
HDLC SERPL-MCNC: 50 MG/DL (ref 40–60)
HGB BLD-MCNC: 14.4 G/DL (ref 13–17.7)
IMM GRANULOCYTES # BLD AUTO: 0.02 10*3/MM3 (ref 0–0.05)
IMM GRANULOCYTES NFR BLD AUTO: 0.3 % (ref 0–0.5)
LDLC SERPL CALC-MCNC: 169 MG/DL (ref 0–100)
LDLC/HDLC SERPL: 3.31 {RATIO}
LYMPHOCYTES # BLD AUTO: 1.66 10*3/MM3 (ref 0.7–3.1)
LYMPHOCYTES NFR BLD AUTO: 23.1 % (ref 19.6–45.3)
MCH RBC QN AUTO: 29.9 PG (ref 26.6–33)
MCHC RBC AUTO-ENTMCNC: 33.7 G/DL (ref 31.5–35.7)
MCV RBC AUTO: 88.6 FL (ref 79–97)
MONOCYTES # BLD AUTO: 0.81 10*3/MM3 (ref 0.1–0.9)
MONOCYTES NFR BLD AUTO: 11.3 % (ref 5–12)
NEUTROPHILS NFR BLD AUTO: 4.58 10*3/MM3 (ref 1.7–7)
NEUTROPHILS NFR BLD AUTO: 63.4 % (ref 42.7–76)
NRBC BLD AUTO-RTO: 0 /100 WBC (ref 0–0.2)
PLATELET # BLD AUTO: 261 10*3/MM3 (ref 140–450)
PMV BLD AUTO: 10.9 FL (ref 6–12)
POTASSIUM SERPL-SCNC: 4.4 MMOL/L (ref 3.5–5.2)
PROT SERPL-MCNC: 8 G/DL (ref 6–8.5)
PSA SERPL-MCNC: 0.57 NG/ML (ref 0–4)
RBC # BLD AUTO: 4.82 10*6/MM3 (ref 4.14–5.8)
SODIUM SERPL-SCNC: 137 MMOL/L (ref 136–145)
TRIGL SERPL-MCNC: 237 MG/DL (ref 0–150)
TSH SERPL DL<=0.05 MIU/L-ACNC: 3.53 UIU/ML (ref 0.27–4.2)
VLDLC SERPL-MCNC: 44 MG/DL (ref 5–40)
WBC NRBC COR # BLD: 7.2 10*3/MM3 (ref 3.4–10.8)

## 2023-01-06 PROCEDURE — 80061 LIPID PANEL: CPT

## 2023-01-06 PROCEDURE — 99396 PREV VISIT EST AGE 40-64: CPT | Performed by: FAMILY MEDICINE

## 2023-01-06 PROCEDURE — 90750 HZV VACC RECOMBINANT IM: CPT | Performed by: FAMILY MEDICINE

## 2023-01-06 PROCEDURE — 80050 GENERAL HEALTH PANEL: CPT

## 2023-01-06 PROCEDURE — 83036 HEMOGLOBIN GLYCOSYLATED A1C: CPT

## 2023-01-06 PROCEDURE — 90471 IMMUNIZATION ADMIN: CPT | Performed by: FAMILY MEDICINE

## 2023-01-06 PROCEDURE — G0103 PSA SCREENING: HCPCS

## 2023-01-06 RX ORDER — BUPROPION HYDROCHLORIDE 300 MG/1
300 TABLET ORAL DAILY
Qty: 30 TABLET | Refills: 11 | Status: SHIPPED | OUTPATIENT
Start: 2023-01-06

## 2023-01-06 NOTE — ASSESSMENT & PLAN NOTE
Patient's depression is recurrent and is moderate without psychosis. Their depression is currently active and the condition is worsening. This will be reassessed at the next regular appointment. F/U as described:patient was prescribed an antidepressant medicine   .   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 In June, 2020 he presented with syncope and incidentally was found to have RV fractured lead. At that time, made some changes to the pacemaker settings. Today he presented to the clinic, in A fib for at least 3 days. He is completely asymptomatic. He is anti-coagulated with Xarelto at present.   - Admit him to the ICU for cardioversion tomorrow.   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 In June, 2020 he presented with syncope and incidentally was found to have RV fractured lead. At that time, made some changes to the pacemaker settings. Today he presented to the clinic, in A fib for at least 3 days. He is completely asymptomatic. He is anti-coagulated with Xarelto at present.   - Admit him to the ICU for cardioversion tomorrow.   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 In June, 2020 he presented with syncope and incidentally was found to have RV fractured lead. At that time, some changes were made to the pacemaker settings. He presented to the cardiology clinic on 9/22 and was found to be in IART for at least 3 days. He is asymptomatic. He is anti-coagulated with Xarelto at present.  - HOLA today and if no evidence of intracardiac thrombus, plan for synchronized cardioversion.  - Increase Sotalol to 120mg BID.  - EP following.  - EKG tomorrow AM. 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 In June, 2020 he presented with syncope and incidentally was found to have RV fractured lead. At that time, some changes were made to the pacemaker settings. He presented to the cardiology clinic on 9/22 and was found to be in IART for at least 3 days. He is asymptomatic. He is anti-coagulated with Xarelto at present. Given the cardiac arrhythmia in the setting of single ventricle congenital heart disease, the patient is considered critically ill and at risk of hemodynamic compromise.    - HOLA today and if no evidence of intracardiac thrombus, plan for synchronized cardioversion under sedation.  - Increase Sotalol to 120mg BID.  - EP following.  - EKG tomorrow AM.

## 2023-01-06 NOTE — PROGRESS NOTES
Subjective     Chief Complaint  Annual Exam (Pt has no concerns/Just wants a check up since he hasn't seen a doctor in a while/His weight is the only thing he might like to address/Maybe starting his Wellbutrin back) and Establish Care    Subjective          Irving Nguyễn II is a 57 y.o. male who presents today to [facility] for initial evaluation .    HPI:   History of Present Illness     Patient presents to establish care with a PCP and to  follow-up on chronic medical problems including hyperlipidemia and depression. Patient denies dizziness, chest pain, palpitations, shortness of breath and cough. Patient complains of depression. Patient is here for monitoring of chronic issues due to need for monitoring of renal function, liver function, blood pressure effects of meds and adverse effects    He hasn't followed with a PCP for 2-3 years.     The following portions of the patient's history were reviewed and updated as appropriate: allergies, current medications, past family history, past medical history, past social history, past surgical history and problem list.    Objective     Objective     Allergy:   No Known Allergies     Current Medications:   Current Outpatient Medications   Medication Sig Dispense Refill   • buPROPion XL (WELLBUTRIN XL) 300 MG 24 hr tablet Take 1 tablet by mouth Daily. 30 tablet 11   • sildenafil (REVATIO) 20 MG tablet 3-4 PO Daily as needed prior to intercourse for ED 90 tablet 5     No current facility-administered medications for this visit.       Past Medical History:  Past Medical History:   Diagnosis Date   • Cancer (HCC)     Skin   • Depression    • Erectile dysfunction        Past Surgical History:  Past Surgical History:   Procedure Laterality Date   • ROTATOR CUFF REPAIR  11/2017   • SQUAMOUS CELL CARCINOMA EXCISION  2007, 2008   • VASECTOMY  2000       Social History:  Social History     Socioeconomic History   • Marital status:     Tobacco Use   • Smoking status: Former     Types: Cigars     Quit date: 2018     Years since quittin.4     Passive exposure: Never   • Smokeless tobacco: Never   • Tobacco comments:     No nicotine since 2017   Substance and Sexual Activity   • Alcohol use: No   • Drug use: No   • Sexual activity: Yes     Partners: Female     Birth control/protection: Surgical     Comment:        Family History:  Family History   Problem Relation Age of Onset   • Obesity Mother    • Depression Mother    • No Known Problems Brother    • No Known Problems Daughter        Review of Systems:  Review of Systems   Constitutional: Negative for activity change, appetite change and fatigue.   Respiratory: Negative for cough, chest tightness, shortness of breath and wheezing.    Cardiovascular: Negative for chest pain, palpitations and leg swelling.   Gastrointestinal: Negative for abdominal pain, blood in stool, constipation and diarrhea.   Allergic/Immunologic: Negative for environmental allergies.   Neurological: Negative for dizziness, facial asymmetry, light-headedness and numbness.   Psychiatric/Behavioral: Negative for agitation, hallucinations, self-injury and suicidal ideas.       Vital Signs:   /72   Pulse 98   Temp 98.8 °F (37.1 °C) (Infrared)   Ht 177 cm (69.69\")   Wt 104 kg (229 lb 6.4 oz)   SpO2 98%   BMI 33.21 kg/m²      Physical Exam:  Physical Exam  Constitutional:       Appearance: He is normal weight.   HENT:      Head: Normocephalic.   Cardiovascular:      Rate and Rhythm: Normal rate and regular rhythm.      Pulses: Normal pulses.      Heart sounds: No murmur heard.  Pulmonary:      Effort: Pulmonary effort is normal. No respiratory distress.      Breath sounds: No wheezing.   Abdominal:      General: Abdomen is flat. Bowel sounds are normal.   Musculoskeletal:         General: No swelling or tenderness.      Cervical back: Normal range of motion.   Skin:     Capillary Refill: Capillary refill  takes less than 2 seconds.   Neurological:      General: No focal deficit present.      Mental Status: He is alert. Mental status is at baseline.   Psychiatric:         Mood and Affect: Mood normal.         Behavior: Behavior normal.         Thought Content: Thought content normal.         Judgment: Judgment normal.               PHQ-9 Score  PHQ-9 Total Score: 1     Lab Review  No visits with results within 3 Month(s) from this visit.   Latest known visit with results is:   Office Visit on 01/29/2019   Component Date Value Ref Range Status   • Glucose 01/29/2019 93  70 - 100 mg/dL Final   • BUN 01/29/2019 20  9 - 23 mg/dL Final   • Creatinine 01/29/2019 1.14  0.60 - 1.30 mg/dL Final   • Sodium 01/29/2019 137  132 - 146 mmol/L Final   • Potassium 01/29/2019 4.1  3.5 - 5.5 mmol/L Final   • Chloride 01/29/2019 103  99 - 109 mmol/L Final   • CO2 01/29/2019 31.0  20.0 - 31.0 mmol/L Final   • Calcium 01/29/2019 9.6  8.7 - 10.4 mg/dL Final   • Total Protein 01/29/2019 6.4  5.7 - 8.2 g/dL Final   • Albumin 01/29/2019 4.58  3.20 - 4.80 g/dL Final   • ALT (SGPT) 01/29/2019 32  7 - 40 U/L Final   • AST (SGOT) 01/29/2019 27  0 - 33 U/L Final   • Alkaline Phosphatase 01/29/2019 44  25 - 100 U/L Final   • Total Bilirubin 01/29/2019 0.5  0.3 - 1.2 mg/dL Final   • eGFR Non African Amer 01/29/2019 67  >60 mL/min/1.73 Final   • Globulin 01/29/2019 1.8  gm/dL Final   • A/G Ratio 01/29/2019 2.5  1.5 - 2.5 g/dL Final   • BUN/Creatinine Ratio 01/29/2019 17.5  7.0 - 25.0 Final   • Anion Gap 01/29/2019 3.0  3.0 - 11.0 mmol/L Final   • Total Cholesterol 01/29/2019 252 (H)  0 - 200 mg/dL Final   • Triglycerides 01/29/2019 97  0 - 150 mg/dL Final   • HDL Cholesterol 01/29/2019 56  40 - 60 mg/dL Final   • LDL Cholesterol  01/29/2019 195 (H)  0 - 130 mg/dL Final   • 25 Hydroxy, Vitamin D 01/29/2019 50.4  ng/ml Final   • Vitamin B-12 01/29/2019 621  211 - 911 pg/mL Final   • TSH 01/29/2019 2.413  0.350 - 5.350 mIU/mL Final   • WBC 01/29/2019  5.18  3.50 - 10.80 10*3/mm3 Final   • RBC 01/29/2019 4.66  4.20 - 5.76 10*6/mm3 Final   • Hemoglobin 01/29/2019 13.7  13.1 - 17.5 g/dL Final   • Hematocrit 01/29/2019 41.3  38.9 - 50.9 % Final   • MCV 01/29/2019 88.6  80.0 - 99.0 fL Final   • MCH 01/29/2019 29.4  27.0 - 31.0 pg Final   • MCHC 01/29/2019 33.2  32.0 - 36.0 g/dL Final   • RDW 01/29/2019 13.3  11.3 - 14.5 % Final   • RDW-SD 01/29/2019 42.9  37.0 - 54.0 fl Final   • MPV 01/29/2019 11.7  6.0 - 12.0 fL Final   • Platelets 01/29/2019 241  150 - 450 10*3/mm3 Final   • Neutrophil % 01/29/2019 56.2  41.0 - 71.0 % Final   • Lymphocyte % 01/29/2019 30.5  24.0 - 44.0 % Final   • Monocyte % 01/29/2019 11.6  0.0 - 12.0 % Final   • Eosinophil % 01/29/2019 1.5  0.0 - 3.0 % Final   • Basophil % 01/29/2019 0.2  0.0 - 1.0 % Final   • Immature Grans % 01/29/2019 0.0  0.0 - 0.6 % Final   • Neutrophils, Absolute 01/29/2019 2.91  1.50 - 8.30 10*3/mm3 Final   • Lymphocytes, Absolute 01/29/2019 1.58  0.60 - 4.80 10*3/mm3 Final   • Monocytes, Absolute 01/29/2019 0.60  0.00 - 1.00 10*3/mm3 Final   • Eosinophils, Absolute 01/29/2019 0.08  0.00 - 0.30 10*3/mm3 Final   • Basophils, Absolute 01/29/2019 0.01  0.00 - 0.20 10*3/mm3 Final   • Immature Grans, Absolute 01/29/2019 0.00  0.00 - 0.03 10*3/mm3 Final        Radiology Results        Assessment / Plan         Assessment and Plan   Diagnoses and all orders for this visit:    1. Annual physical exam (Primary)  Assessment & Plan:  Annual exam completed today. Health Maintenance including immunizations was updated and reflected in the chart. Yearly screening labs were ordered.     Further recommendations to be given once lab data received.     Health advice: healthy food choices with fresh fruits and vegetables, maintain sleep pattern at least 8 hours, avoid texting and distracted driving practices; wear safety belt, engage in regular exercise, maintain healthy weight, use safe sex practices, avoid alcohol and illicit drugs.  Maintain immunizations that are up to date. Maintain health maintenance:PSA, Colonoscopy etc.  Follow up with PCP if struggling with depression or anxiety. Keep regular dental and eye exams. Brush and floss teeth daily.     F/U annually and prn.       Orders:  -     CBC & Differential; Future  -     Hemoglobin A1c; Future  -     Lipid Panel; Future  -     Comprehensive Metabolic Panel; Future  -     TSH Rfx On Abnormal To Free T4; Future  -     PSA Screen; Future    2. Reactive depression  Assessment & Plan:  Patient's depression is recurrent and is moderate without psychosis. Their depression is currently active and the condition is worsening. This will be reassessed at the next regular appointment. F/U as described:patient was prescribed an antidepressant medicine   .    Orders:  -     buPROPion XL (WELLBUTRIN XL) 300 MG 24 hr tablet; Take 1 tablet by mouth Daily.  Dispense: 30 tablet; Refill: 11    3. Screening for colon cancer  -     Cologuard - Stool, Per Rectum; Future    4. Screening for prostate cancer  -     PSA Screen; Future    5. Need for vaccination  -     Shingrix Vaccine  -     Shingrix Vaccine; Future      Discussed possible differential diagnoses, testing, treatment, recommended non-pharmacological interventions, risks, warning signs to monitor for that would indicate need for follow-up in clinic or ER. If no improvement with these regimens or you have new or worsening symptoms follow-up. Patient verbalizes understanding and agreement with plan of care. Denies further needs or concerns.     Patient was given instructions and counseling regarding her condition and for health maintenance advised.    BMI is >= 30 and <35. (Class 1 Obesity). The following options were offered after discussion;: weight loss educational material (shared in after visit summary)         Health Maintenance  Health Maintenance:   Health Maintenance Due   Topic Date Due   • COLORECTAL CANCER SCREENING  Never done   • LIPID  PANEL  01/29/2020        Meds ordered during this visit  New Medications Ordered This Visit   Medications   • buPROPion XL (WELLBUTRIN XL) 300 MG 24 hr tablet     Sig: Take 1 tablet by mouth Daily.     Dispense:  30 tablet     Refill:  11     Pt needs appointment       Meds stopped during this visit:  Medications Discontinued During This Encounter   Medication Reason   • buPROPion XL (WELLBUTRIN XL) 300 MG 24 hr tablet Reorder        Visit Diagnoses    ICD-10-CM ICD-9-CM   1. Annual physical exam  Z00.00 V70.0   2. Reactive depression  F32.9 300.4   3. Screening for colon cancer  Z12.11 V76.51   4. Screening for prostate cancer  Z12.5 V76.44   5. Need for vaccination  Z23 V05.9       Patient was given instructions and counseling regarding his condition or for health maintenance advice. Please see specific information pulled into the AVS if appropriate.     Follow Up   Return in about 1 year (around 1/6/2024) for Annual.      This document has been electronically signed by Renu Barboza DO   January 6, 2023 11:06 EST    Dictated Utilizing Dragon Dictation: Part of this note may be an electronic transcription/translation of spoken language to printed text using the Dragon Dictation System.    Renu Barboza D.O.  [facility]

## 2023-01-06 NOTE — ASSESSMENT & PLAN NOTE
Annual exam completed today. Health Maintenance including immunizations was updated and reflected in the chart. Yearly screening labs were ordered.     Further recommendations to be given once lab data received.     Health advice: healthy food choices with fresh fruits and vegetables, maintain sleep pattern at least 8 hours, avoid texting and distracted driving practices; wear safety belt, engage in regular exercise, maintain healthy weight, use safe sex practices, avoid alcohol and illicit drugs. Maintain immunizations that are up to date. Maintain health maintenance:PSA, Colonoscopy etc.  Follow up with PCP if struggling with depression or anxiety. Keep regular dental and eye exams. Brush and floss teeth daily.     F/U annually and prn.

## 2023-07-24 ENCOUNTER — OFFICE VISIT (OUTPATIENT)
Dept: FAMILY MEDICINE CLINIC | Facility: CLINIC | Age: 58
End: 2023-07-24
Payer: COMMERCIAL

## 2023-07-24 ENCOUNTER — LAB (OUTPATIENT)
Dept: LAB | Facility: HOSPITAL | Age: 58
End: 2023-07-24
Payer: COMMERCIAL

## 2023-07-24 VITALS
BODY MASS INDEX: 32.27 KG/M2 | SYSTOLIC BLOOD PRESSURE: 140 MMHG | HEIGHT: 70 IN | TEMPERATURE: 98 F | DIASTOLIC BLOOD PRESSURE: 80 MMHG | HEART RATE: 74 BPM | WEIGHT: 225.4 LBS

## 2023-07-24 DIAGNOSIS — R53.83 FATIGUE, UNSPECIFIED TYPE: ICD-10-CM

## 2023-07-24 DIAGNOSIS — E78.5 HYPERLIPIDEMIA, UNSPECIFIED HYPERLIPIDEMIA TYPE: ICD-10-CM

## 2023-07-24 DIAGNOSIS — R73.03 PREDIABETES: ICD-10-CM

## 2023-07-24 DIAGNOSIS — E66.09 CLASS 1 OBESITY DUE TO EXCESS CALORIES WITH SERIOUS COMORBIDITY AND BODY MASS INDEX (BMI) OF 32.0 TO 32.9 IN ADULT: Primary | ICD-10-CM

## 2023-07-24 LAB
ALBUMIN SERPL-MCNC: 4.5 G/DL (ref 3.5–5.2)
ALBUMIN/GLOB SERPL: 1.8 G/DL
ALP SERPL-CCNC: 63 U/L (ref 39–117)
ALT SERPL W P-5'-P-CCNC: 30 U/L (ref 1–41)
ANION GAP SERPL CALCULATED.3IONS-SCNC: 11 MMOL/L (ref 5–15)
AST SERPL-CCNC: 24 U/L (ref 1–40)
BASOPHILS # BLD AUTO: 0.03 10*3/MM3 (ref 0–0.2)
BASOPHILS NFR BLD AUTO: 0.5 % (ref 0–1.5)
BILIRUB SERPL-MCNC: 0.2 MG/DL (ref 0–1.2)
BUN SERPL-MCNC: 17 MG/DL (ref 6–20)
BUN/CREAT SERPL: 17 (ref 7–25)
CALCIUM SPEC-SCNC: 9.5 MG/DL (ref 8.6–10.5)
CHLORIDE SERPL-SCNC: 103 MMOL/L (ref 98–107)
CHOLEST SERPL-MCNC: 226 MG/DL (ref 0–200)
CO2 SERPL-SCNC: 24 MMOL/L (ref 22–29)
CREAT SERPL-MCNC: 1 MG/DL (ref 0.76–1.27)
DEPRECATED RDW RBC AUTO: 43.4 FL (ref 37–54)
EGFRCR SERPLBLD CKD-EPI 2021: 86.7 ML/MIN/1.73
EOSINOPHIL # BLD AUTO: 0.09 10*3/MM3 (ref 0–0.4)
EOSINOPHIL NFR BLD AUTO: 1.4 % (ref 0.3–6.2)
ERYTHROCYTE [DISTWIDTH] IN BLOOD BY AUTOMATED COUNT: 13.6 % (ref 12.3–15.4)
EXPIRATION DATE: NORMAL
GLOBULIN UR ELPH-MCNC: 2.5 GM/DL
GLUCOSE SERPL-MCNC: 80 MG/DL (ref 65–99)
HBA1C MFR BLD: 5.7 %
HCT VFR BLD AUTO: 40.5 % (ref 37.5–51)
HDLC SERPL-MCNC: 44 MG/DL (ref 40–60)
HGB BLD-MCNC: 13.6 G/DL (ref 13–17.7)
IMM GRANULOCYTES # BLD AUTO: 0.03 10*3/MM3 (ref 0–0.05)
IMM GRANULOCYTES NFR BLD AUTO: 0.5 % (ref 0–0.5)
LDLC SERPL CALC-MCNC: 130 MG/DL (ref 0–100)
LDLC/HDLC SERPL: 2.81 {RATIO}
LYMPHOCYTES # BLD AUTO: 1.6 10*3/MM3 (ref 0.7–3.1)
LYMPHOCYTES NFR BLD AUTO: 24.4 % (ref 19.6–45.3)
Lab: NORMAL
MCH RBC QN AUTO: 29.6 PG (ref 26.6–33)
MCHC RBC AUTO-ENTMCNC: 33.6 G/DL (ref 31.5–35.7)
MCV RBC AUTO: 88 FL (ref 79–97)
MONOCYTES # BLD AUTO: 0.75 10*3/MM3 (ref 0.1–0.9)
MONOCYTES NFR BLD AUTO: 11.5 % (ref 5–12)
NEUTROPHILS NFR BLD AUTO: 4.05 10*3/MM3 (ref 1.7–7)
NEUTROPHILS NFR BLD AUTO: 61.7 % (ref 42.7–76)
NRBC BLD AUTO-RTO: 0.2 /100 WBC (ref 0–0.2)
PLATELET # BLD AUTO: 217 10*3/MM3 (ref 140–450)
PMV BLD AUTO: 11.1 FL (ref 6–12)
POTASSIUM SERPL-SCNC: 4.1 MMOL/L (ref 3.5–5.2)
PROT SERPL-MCNC: 7 G/DL (ref 6–8.5)
RBC # BLD AUTO: 4.6 10*6/MM3 (ref 4.14–5.8)
SODIUM SERPL-SCNC: 138 MMOL/L (ref 136–145)
TRIGL SERPL-MCNC: 291 MG/DL (ref 0–150)
VLDLC SERPL-MCNC: 52 MG/DL (ref 5–40)
WBC NRBC COR # BLD: 6.55 10*3/MM3 (ref 3.4–10.8)

## 2023-07-24 PROCEDURE — 84402 ASSAY OF FREE TESTOSTERONE: CPT

## 2023-07-24 PROCEDURE — 99214 OFFICE O/P EST MOD 30 MIN: CPT | Performed by: FAMILY MEDICINE

## 2023-07-24 PROCEDURE — 80050 GENERAL HEALTH PANEL: CPT

## 2023-07-24 PROCEDURE — 80061 LIPID PANEL: CPT

## 2023-07-24 PROCEDURE — 36415 COLL VENOUS BLD VENIPUNCTURE: CPT

## 2023-07-24 PROCEDURE — 84403 ASSAY OF TOTAL TESTOSTERONE: CPT

## 2023-07-24 PROCEDURE — 93000 ELECTROCARDIOGRAM COMPLETE: CPT | Performed by: FAMILY MEDICINE

## 2023-07-24 PROCEDURE — 83036 HEMOGLOBIN GLYCOSYLATED A1C: CPT | Performed by: FAMILY MEDICINE

## 2023-07-24 NOTE — PROGRESS NOTES
Subjective     Chief Complaint  Fatigue and Weight Loss (Exercising 6 days a week, would like to discuss options)    Subjective          Irving Nguyễn II is a 58 y.o. male who presents today to [facility] for initial evaluation .    HPI:   History of Present Illness    Mr. Nguyễn is a very pleasant 58-year-old male who presents today with complaints of issues with weight loss and concern for diabetes.  He has been trying for the last 6+ months to lose weight.  He has lost 4 pounds by our scales.  He exercises 6 days/week.  He has noticed also quite a bit of fatigue.  He has a lot of stress in his day-to-day life, he works 3 different jobs.  He also reports feeling very fatigued.  He states that by 9 PM he is so tired he just crashes.    The following portions of the patient's history were reviewed and updated as appropriate: allergies, current medications, past family history, past medical history, past social history, past surgical history and problem list.    Objective     Objective     Allergy:   No Known Allergies     Current Medications:   Current Outpatient Medications   Medication Sig Dispense Refill    sildenafil (REVATIO) 20 MG tablet 3-4 PO Daily as needed prior to intercourse for ED 90 tablet 5     No current facility-administered medications for this visit.       Past Medical History:  Past Medical History:   Diagnosis Date    Cancer     Skin    Depression     Erectile dysfunction     Obesity        Past Surgical History:  Past Surgical History:   Procedure Laterality Date    ROTATOR CUFF REPAIR  2017    SQUAMOUS CELL CARCINOMA EXCISION  ,     VASECTOMY  2000       Social History:  Social History     Socioeconomic History    Marital status:    Tobacco Use    Smoking status: Former     Types: Cigars     Quit date: 2018     Years since quittin.9     Passive exposure: Never    Smokeless tobacco: Never    Tobacco comments:     No nicotine  "since 2017   Substance and Sexual Activity    Alcohol use: No    Drug use: No    Sexual activity: Not Currently     Partners: Female     Birth control/protection: Surgical     Comment:        Family History:  Family History   Problem Relation Age of Onset    Obesity Mother     Depression Mother     No Known Problems Brother     No Known Problems Daughter            Vital Signs:   /80   Pulse 74   Temp 98 °F (36.7 °C) (Infrared)   Ht 177 cm (69.69\")   Wt 102 kg (225 lb 6.4 oz)   BMI 32.63 kg/m²      Physical Exam:  Physical Exam  Constitutional:       Appearance: He is obese. He is not ill-appearing.   Eyes:      Pupils: Pupils are equal, round, and reactive to light.   Cardiovascular:      Rate and Rhythm: Normal rate.      Pulses: Normal pulses.   Pulmonary:      Effort: Pulmonary effort is normal.   Skin:     Capillary Refill: Capillary refill takes less than 2 seconds.   Neurological:      General: No focal deficit present.      Mental Status: He is alert. Mental status is at baseline.   Psychiatric:         Mood and Affect: Mood normal.         Behavior: Behavior normal.         Thought Content: Thought content normal.         Judgment: Judgment normal.         ECG 12 Lead    Date/Time: 7/24/2023 11:23 AM  Performed by: Renu Barboza DO  Authorized by: Renu Barboza DO   Previous ECG: no previous ECG available  Rhythm: sinus rhythm  Rate: normal  Conduction: conduction normal  QRS axis: normal    Clinical impression: normal ECG         PHQ-9 Score  PHQ-9 Total Score:       Lab Review  No visits with results within 3 Month(s) from this visit.   Latest known visit with results is:   Results Encounter on 01/06/2023   Component Date Value Ref Range Status    Cologuard 01/11/2023 Negative  Negative Final    Comment:   NEGATIVE TEST RESULT. A negative Cologuard result indicates a low likelihood that a colorectal cancer (CRC) or advanced adenoma (adenomatous polyps with more advanced pre-malignant " features)  is present. The chance that a person with a negative Cologuard test has a colorectal cancer is less than 1 in 1500 (negative predictive value >99.9%) or has an  advanced adenoma is less than  5.3% (negative predictive value 94.7%). These data are based on a prospective cross-sectional study of 10,000 individuals at average risk for colorectal cancer who were screened with both Cologuard and colonoscopy. (Spenser DEMPSEY et al, N Engl J Med 2014;370(14):1049-4047) The normal value (reference range) for this assay is negative.    COLOGUARD RE-SCREENING RECOMMENDATION: Periodic colorectal cancer screening is an important part of preventive healthcare for asymptomatic individuals at average risk for colorectal cancer.  Following a negative Cologuard result, the American Cancer Society and U.S.                            Multi-Society Task Force screening guidelines recommend a Cologuard re-screening interval of 3 years.   References: American Cancer Society Guideline for Colorectal Cancer Screening: https://www.cancer.org/cancer/colon-rectal-cancer/jlzcuvppu-ooadegtkz-wndmypa/acs-recommendations.html.; Obinna NIEVES, Jaida GORDON, Severo ARGUETAK, Colorectal Cancer Screening: Recommendations for Physicians and Patients from the U.S. Multi-Society Task Force on Colorectal Cancer Screening , Am J Gastroenterology 2017; 112:3414-7081.    TEST DESCRIPTION: Composite algorithmic analysis of stool DNA-biomarkers with hemoglobin immunoassay.   Quantitative values of individual biomarkers are not reportable and are not associated with individual biomarker result reference ranges. Cologuard is intended for colorectal cancer screening of adults of either sex, 45 years or older, who are at average-risk for colorectal cancer (CRC). Cologuard has been approved for use by the U.S. FDA. The performance of Cologuard was                            established in a cross sectional study of average-risk adults aged 50-84. Cologuard performance  in patients ages 45 to 49 years was estimated by sub-group analysis of near-age groups. Colonoscopies performed for a positive result may find as the most clinically significant lesion: colorectal cancer [4.0%], advanced adenoma (including sessile serrated polyps greater than or equal to 1cm diameter) [20%] or non- advanced adenoma [31%]; or no colorectal neoplasia [45%]. These estimates are derived from a prospective cross-sectional screening study of 10,000 individuals at average risk for colorectal cancer who were screened with both Cologuard and colonoscopy. (Spenser Wright al, N Engl J Med 2014;370(14):8238-4911.) Cologuard may produce a false negative or false positive result (no colorectal cancer or precancerous polyp present at colonoscopy follow up). A negative Cologuard test result does not guarantee the absence of CRC or advanced adenoma (pre-cancer). The current Cologuard                            screening interval is every 3 years. (American Cancer Society and U.S. Multi-Society Task Force). Cologuard performance data in a 10,000 patient pivotal study using colonoscopy as the reference method can be accessed at the following location: www.TapnScrap/results. Additional description of the Cologuard test process, warnings and precautions can be found at www.[employer].Here On Biz.          Radiology Results             Assessment / Plan         Assessment and Plan   Diagnoses and all orders for this visit:    1. Class 1 obesity due to excess calories with serious comorbidity and body mass index (BMI) of 32.0 to 32.9 in adult (Primary)  -     ECG 12 Lead    2. Prediabetes  -     POC Glycosylated Hemoglobin (Hb A1C)    3. Fatigue, unspecified type  -     Comprehensive Metabolic Panel; Future  -     CBC & Differential; Future  -     TSH Rfx On Abnormal To Free T4; Future  -     Testosterone, Free, Total; Future  -     Testosterone; Future    4. Hyperlipidemia, unspecified hyperlipidemia type  -     Lipid panel;  Future        Hemoglobin A1c by fingerstick in office today is 5.7 which is improved from his prior.  The lifestyle changes that he has made has made significant benefit in his lab.  However he notes that he has not lost weight by his scales.  He is understandably frustrated with this.  He also reports significant fatigue, I have ordered baseline labs to be repeated including a testosterone level.    In discussion with weight loss he would benefit from a potential GLP-1 as he is obese with a comorbidity.  However, it is very difficult to obtain his prescriptions due to supply chain issues.  He would benefit from phentermine as he does not have a contraindication.  EKG in office today was within normal limits.  I discussed both of these options in detail with him.  He reports that he would like to do some of his own research and will be in contact about what medication he would like to try.    Discussed possible differential diagnoses, testing, treatment, recommended non-pharmacological interventions, risks, warning signs to monitor for that would indicate need for follow-up in clinic or ER. If no improvement with these regimens or you have new or worsening symptoms follow-up. Patient verbalizes understanding and agreement with plan of care. Denies further needs or concerns.     Patient was given instructions and counseling regarding her condition and for health maintenance advised.            BMI is >= 30 and <35. (Class 1 Obesity). The following options were offered after discussion;: exercise counseling/recommendations, nutrition counseling/recommendations, and pharmacological intervention options           Health Maintenance  Health Maintenance:   There are no preventive care reminders to display for this patient.       Meds ordered during this visit  No orders of the defined types were placed in this encounter.      Meds stopped during this visit:  Medications Discontinued During This Encounter   Medication  Reason    buPROPion XL (WELLBUTRIN XL) 300 MG 24 hr tablet         Visit Diagnoses    ICD-10-CM ICD-9-CM   1. Class 1 obesity due to excess calories with serious comorbidity and body mass index (BMI) of 32.0 to 32.9 in adult  E66.09 278.00    Z68.32 V85.32   2. Prediabetes  R73.03 790.29   3. Fatigue, unspecified type  R53.83 780.79   4. Hyperlipidemia, unspecified hyperlipidemia type  E78.5 272.4       Patient was given instructions and counseling regarding his condition or for health maintenance advice. Please see specific information pulled into the AVS if appropriate.     Follow Up   No follow-ups on file.          This document has been electronically signed by Renu Barboza DO   July 24, 2023 11:23 EDT    Dictated Utilizing Dragon Dictation: Part of this note may be an electronic transcription/translation of spoken language to printed text using the Dragon Dictation System.    Renu Barboza D.O.  [facility] Answers submitted by the patient for this visit:  Primary Reason for Visit (Submitted on 7/21/2023)  What is the primary reason for your visit?: Other  Other (Submitted on 7/21/2023)  Please describe your symptoms.: Difficulty losing weight. Exercising 6 days a week. I could be eating a bit better, but the weight increases. Fatigue. Decreased motivation and energy.  Have you had these symptoms before?: No  How long have you been having these symptoms?: Greater than 2 weeks  Please list any medications you are currently taking for this condition.: No medications. On last visit I was prescribed wellbutrin. I filled it but never started it. No depressive symptoms. High job satisfaction and engagement.

## 2023-07-25 LAB — TSH SERPL DL<=0.05 MIU/L-ACNC: 2.13 UIU/ML (ref 0.27–4.2)

## 2023-07-26 DIAGNOSIS — E66.09 CLASS 1 OBESITY DUE TO EXCESS CALORIES WITH SERIOUS COMORBIDITY AND BODY MASS INDEX (BMI) OF 32.0 TO 32.9 IN ADULT: Primary | ICD-10-CM

## 2023-07-26 RX ORDER — PHENTERMINE HYDROCHLORIDE 37.5 MG/1
37.5 TABLET ORAL
Qty: 30 TABLET | Refills: 0 | Status: SHIPPED | OUTPATIENT
Start: 2023-07-26

## 2023-07-31 LAB
TESTOST FREE SERPL-MCNC: 4.6 PG/ML (ref 7.2–24)
TESTOST SERPL-MCNC: 347 NG/DL (ref 264–916)

## 2023-08-02 DIAGNOSIS — E29.1 TESTICULAR HYPOFUNCTION: Primary | ICD-10-CM

## 2023-08-02 RX ORDER — TESTOSTERONE CYPIONATE 200 MG/ML
200 VIAL (ML) INTRAMUSCULAR
Qty: 1.96 ML | Refills: 0 | Status: SHIPPED | OUTPATIENT
Start: 2023-08-02

## 2023-08-22 DIAGNOSIS — E66.09 CLASS 1 OBESITY DUE TO EXCESS CALORIES WITH SERIOUS COMORBIDITY AND BODY MASS INDEX (BMI) OF 32.0 TO 32.9 IN ADULT: ICD-10-CM

## 2023-08-23 NOTE — TELEPHONE ENCOUNTER
Rx Refill Note  Requested Prescriptions     Pending Prescriptions Disp Refills    phentermine (ADIPEX-P) 37.5 MG tablet 30 tablet 0     Sig: Take 1 tablet by mouth Every Morning Before Breakfast.      Last office visit with prescribing clinician: 7/24/2023   Last telemedicine visit with prescribing clinician: Visit date not found   Next office visit with prescribing clinician: Visit date not found                         Would you like a call back once the refill request has been completed: [] Yes [] No    If the office needs to give you a call back, can they leave a voicemail: [] Yes [] No    Sophie Torres  08/23/23, 08:46 EDTRx Refill Note  Requested Prescriptions     Pending Prescriptions Disp Refills    phentermine (ADIPEX-P) 37.5 MG tablet 30 tablet 0     Sig: Take 1 tablet by mouth Every Morning Before Breakfast.      Last office visit with prescribing clinician: 7/24/2023   Last telemedicine visit with prescribing clinician: Visit date not found   Next office visit with prescribing clinician: Visit date not found                         Would you like a call back once the refill request has been completed: [] Yes [] No    If the office needs to give you a call back, can they leave a voicemail: [] Yes [] No    Sophie Torres  08/23/23, 08:46 EDT

## 2023-08-24 RX ORDER — PHENTERMINE HYDROCHLORIDE 37.5 MG/1
37.5 TABLET ORAL
Qty: 30 TABLET | Refills: 0 | Status: SHIPPED | OUTPATIENT
Start: 2023-08-24

## 2023-08-25 DIAGNOSIS — E29.1 TESTICULAR HYPOFUNCTION: ICD-10-CM

## 2023-08-25 RX ORDER — TESTOSTERONE CYPIONATE 200 MG/ML
200 VIAL (ML) INTRAMUSCULAR
Qty: 1.96 ML | Refills: 0 | Status: SHIPPED | OUTPATIENT
Start: 2023-08-25

## 2023-08-25 NOTE — TELEPHONE ENCOUNTER
Rx Refill Note  Requested Prescriptions     Pending Prescriptions Disp Refills    Testosterone Cypionate 200 MG/ML solution 1.96 mL 0     Sig: Inject 1 mL as directed Every 14 (Fourteen) Days.      Last office visit with prescribing clinician: 7/24/2023   Last telemedicine visit with prescribing clinician: Visit date not found   Next office visit with prescribing clinician: Visit date not found                         Would you like a call back once the refill request has been completed: [] Yes [] No    If the office needs to give you a call back, can they leave a voicemail: [] Yes [] No    Pati Ji MA  08/25/23, 10:52 EDT

## 2023-08-30 ENCOUNTER — TELEPHONE (OUTPATIENT)
Dept: FAMILY MEDICINE CLINIC | Facility: CLINIC | Age: 58
End: 2023-08-30
Payer: COMMERCIAL

## 2023-08-30 NOTE — TELEPHONE ENCOUNTER
"  Caller: Irving Nguyễn II \"Stan\"    Relationship: Self    Best call back number: [phone]     What was the call regarding: PATIENTS TESTOSTERONE IS DUE TODAY! PLEASE CALL THE PHARMACY ASAP SO PATIENT CAN STAY OM SCHEDULE.       Is it okay if the provider responds through MyChart: YES    [address] [phone] [facility] [phone]  [phone]   "

## 2023-08-31 ENCOUNTER — PRIOR AUTHORIZATION (OUTPATIENT)
Dept: FAMILY MEDICINE CLINIC | Facility: CLINIC | Age: 58
End: 2023-08-31
Payer: COMMERCIAL

## 2023-08-31 DIAGNOSIS — E29.1 TESTICULAR HYPOFUNCTION: ICD-10-CM

## 2023-08-31 RX ORDER — TESTOSTERONE CYPIONATE 200 MG/ML
200 VIAL (ML) INTRAMUSCULAR
Qty: 1.96 ML | Refills: 0 | Status: SHIPPED | OUTPATIENT
Start: 2023-08-31

## 2023-08-31 NOTE — TELEPHONE ENCOUNTER
9/6/23    PA denied    Irving Nguyễn Key: [id number] - PA Case ID: [id number] - Rx #: [id number]  PA Case: [id number], Status: Denied. Notification: Completed.  Drug  Testosterone Cypionate 200MG/ML intramuscular solution  Form        8/31/23    PA started and sent to kristen Nguyễn Key: [id number] - Rx #: [id number]  Drug  Testosterone Cypionate 200MG/ML intramuscular solution

## 2023-09-19 DIAGNOSIS — E66.09 CLASS 1 OBESITY DUE TO EXCESS CALORIES WITH SERIOUS COMORBIDITY AND BODY MASS INDEX (BMI) OF 32.0 TO 32.9 IN ADULT: ICD-10-CM

## 2023-09-19 RX ORDER — PHENTERMINE HYDROCHLORIDE 37.5 MG/1
37.5 TABLET ORAL
Qty: 30 TABLET | Refills: 0 | Status: SHIPPED | OUTPATIENT
Start: 2023-09-19

## 2023-09-24 DIAGNOSIS — E29.1 TESTICULAR HYPOFUNCTION: ICD-10-CM

## 2023-09-25 RX ORDER — TESTOSTERONE CYPIONATE 200 MG/ML
200 VIAL (ML) INTRAMUSCULAR
Qty: 2 ML | Refills: 2 | Status: SHIPPED | OUTPATIENT
Start: 2023-09-25

## 2023-09-25 NOTE — TELEPHONE ENCOUNTER
Rx Refill Note  Requested Prescriptions     Pending Prescriptions Disp Refills    Testosterone Cypionate 200 MG/ML solution 1.96 mL 0     Sig: Inject 1 mL as directed Every 14 (Fourteen) Days.      Last office visit with prescribing clinician: 7/24/2023   Last telemedicine visit with prescribing clinician: Visit date not found   Next office visit with prescribing clinician: Visit date not found                         Would you like a call back once the refill request has been completed: [] Yes [] No    If the office needs to give you a call back, can they leave a voicemail: [] Yes [] No    Sophie Torres  09/25/23, 08:36 EDT

## 2023-10-21 DIAGNOSIS — E29.1 TESTICULAR HYPOFUNCTION: ICD-10-CM

## 2023-10-21 DIAGNOSIS — E66.09 CLASS 1 OBESITY DUE TO EXCESS CALORIES WITH SERIOUS COMORBIDITY AND BODY MASS INDEX (BMI) OF 32.0 TO 32.9 IN ADULT: ICD-10-CM

## 2023-10-23 RX ORDER — TESTOSTERONE CYPIONATE 200 MG/ML
200 VIAL (ML) INTRAMUSCULAR
Qty: 2 ML | Refills: 2 | Status: SHIPPED | OUTPATIENT
Start: 2023-10-23

## 2023-10-23 RX ORDER — PHENTERMINE HYDROCHLORIDE 37.5 MG/1
37.5 TABLET ORAL
Qty: 30 TABLET | Refills: 0 | Status: SHIPPED | OUTPATIENT
Start: 2023-10-23

## 2023-10-23 NOTE — TELEPHONE ENCOUNTER
Rx Refill Note  Requested Prescriptions     Pending Prescriptions Disp Refills    phentermine (ADIPEX-P) 37.5 MG tablet 30 tablet 0     Sig: Take 1 tablet by mouth Every Morning Before Breakfast.    Testosterone Cypionate 200 MG/ML solution 2 mL 2     Sig: Inject 1 mL as directed Every 14 (Fourteen) Days.      Last office visit with prescribing clinician: 7/24/2023   Last telemedicine visit with prescribing clinician: Visit date not found   Next office visit with prescribing clinician: Visit date not found                         Would you like a call back once the refill request has been completed: [] Yes [] No    If the office needs to give you a call back, can they leave a voicemail: [] Yes [] No    Pati Ji MA  10/23/23, 08:44 EDT

## 2023-11-20 DIAGNOSIS — E29.1 TESTICULAR HYPOFUNCTION: ICD-10-CM

## 2023-11-20 RX ORDER — TESTOSTERONE CYPIONATE 200 MG/ML
200 VIAL (ML) INTRAMUSCULAR
Qty: 2 ML | Refills: 2 | Status: SHIPPED | OUTPATIENT
Start: 2023-11-20

## 2023-11-20 NOTE — TELEPHONE ENCOUNTER
Rx Refill Note  Requested Prescriptions     Pending Prescriptions Disp Refills    Testosterone Cypionate 200 MG/ML solution 2 mL 2     Sig: Inject 1 mL as directed Every 14 (Fourteen) Days.      Last office visit with prescribing clinician: 7/24/2023   Last telemedicine visit with prescribing clinician: Visit date not found   Next office visit with prescribing clinician: Visit date not found                         Would you like a call back once the refill request has been completed: [] Yes [] No    If the office needs to give you a call back, can they leave a voicemail: [] Yes [] No    Pati Ji MA  11/20/23, 10:38 EST

## 2023-11-22 DIAGNOSIS — E29.1 TESTICULAR HYPOFUNCTION: ICD-10-CM

## 2023-11-22 RX ORDER — TESTOSTERONE CYPIONATE 200 MG/ML
200 VIAL (ML) INTRAMUSCULAR
Qty: 2 ML | Refills: 2 | Status: CANCELLED | OUTPATIENT
Start: 2023-11-22

## 2023-11-22 NOTE — TELEPHONE ENCOUNTER
Rx Refill Note  Requested Prescriptions     Pending Prescriptions Disp Refills    Testosterone Cypionate 200 MG/ML solution 2 mL 2     Sig: Inject 1 mL as directed Every 14 (Fourteen) Days.      Last office visit with prescribing clinician: 7/24/2023   Last telemedicine visit with prescribing clinician: Visit date not found   Next office visit with prescribing clinician: Visit date not found                         Would you like a call back once the refill request has been completed: [] Yes [] No    If the office needs to give you a call back, can they leave a voicemail: [] Yes [] No    Jace Betancourt MA  11/22/23, 08:13 EST

## 2023-11-24 DIAGNOSIS — E66.09 CLASS 1 OBESITY DUE TO EXCESS CALORIES WITH SERIOUS COMORBIDITY AND BODY MASS INDEX (BMI) OF 32.0 TO 32.9 IN ADULT: ICD-10-CM

## 2023-11-27 RX ORDER — PHENTERMINE HYDROCHLORIDE 37.5 MG/1
37.5 TABLET ORAL
Qty: 30 TABLET | Refills: 0 | OUTPATIENT
Start: 2023-11-27

## 2023-11-28 ENCOUNTER — OFFICE VISIT (OUTPATIENT)
Dept: FAMILY MEDICINE CLINIC | Facility: CLINIC | Age: 58
End: 2023-11-28
Payer: COMMERCIAL

## 2023-11-28 VITALS
SYSTOLIC BLOOD PRESSURE: 126 MMHG | TEMPERATURE: 98 F | WEIGHT: 189.8 LBS | HEIGHT: 70 IN | BODY MASS INDEX: 27.17 KG/M2 | HEART RATE: 70 BPM | DIASTOLIC BLOOD PRESSURE: 70 MMHG

## 2023-11-28 DIAGNOSIS — Z12.5 SCREENING FOR MALIGNANT NEOPLASM OF PROSTATE: ICD-10-CM

## 2023-11-28 DIAGNOSIS — E29.1 TESTICULAR HYPOFUNCTION: ICD-10-CM

## 2023-11-28 DIAGNOSIS — E66.3 OVERWEIGHT WITH BODY MASS INDEX (BMI) OF 27 TO 27.9 IN ADULT: Primary | ICD-10-CM

## 2023-11-28 DIAGNOSIS — Z51.81 THERAPEUTIC DRUG MONITORING: ICD-10-CM

## 2023-11-28 PROBLEM — E66.811 CLASS 1 OBESITY DUE TO EXCESS CALORIES WITH SERIOUS COMORBIDITY AND BODY MASS INDEX (BMI) OF 32.0 TO 32.9 IN ADULT: Status: ACTIVE | Noted: 2023-11-28

## 2023-11-28 PROBLEM — E66.09 CLASS 1 OBESITY DUE TO EXCESS CALORIES WITH SERIOUS COMORBIDITY AND BODY MASS INDEX (BMI) OF 32.0 TO 32.9 IN ADULT: Status: ACTIVE | Noted: 2023-11-28

## 2023-11-28 RX ORDER — PHENTERMINE HYDROCHLORIDE 37.5 MG/1
37.5 TABLET ORAL
Qty: 30 TABLET | Refills: 0 | Status: CANCELLED | OUTPATIENT
Start: 2023-11-28

## 2023-11-28 RX ORDER — TESTOSTERONE CYPIONATE 200 MG/ML
200 VIAL (ML) INTRAMUSCULAR
Qty: 2 ML | Refills: 2 | Status: SHIPPED | OUTPATIENT
Start: 2023-11-28

## 2023-11-28 NOTE — ASSESSMENT & PLAN NOTE
Energy has been much improved.  He is due for repeat labs.  He is tolerating medication well unable to self administer.

## 2023-11-28 NOTE — PROGRESS NOTES
Subjective     Chief Complaint  Weight Loss (Phentermine refill)    Subjective          Irving Nguyễn II is a 58 y.o. male who presents today to [facility] for follow up.    HPI:   History of Present Illness    Mr. Nguyễn is a very pleasant 58-year-old male who presents today for medication refills.    At last visit he had complaint of trying to lose weight.  He been trying for greater than 6 months and had not seen a lot of benefit.  He exercises at least 6 days/week.  He is very busy and works 3 different jobs.  After last visit labs were obtained and he was found to have low testosterone.  He also was prescribed phentermine for weight loss.    His weight at onset of phentermine was 225 pounds.  His weight in office today is 189 lbs. This is over the last 4 months. He states that he is feeling great. His energy levels are great. He is not experiencing any side effects with the medication.     He has also been getting testosterone replacement in the form of 200 mg every 14 days.  He is due for repeat labs and PSA.    The following portions of the patient's history were reviewed and updated as appropriate: allergies, current medications, past family history, past medical history, past social history, past surgical history and problem list.    Objective     Objective     Allergy:   No Known Allergies     Current Medications:   Current Outpatient Medications   Medication Sig Dispense Refill    sildenafil (REVATIO) 20 MG tablet 3-4 PO Daily as needed prior to intercourse for ED 90 tablet 5    Testosterone Cypionate 200 MG/ML solution Inject 1 mL as directed Every 14 (Fourteen) Days. 2 mL 2     No current facility-administered medications for this visit.       Past Medical History:  Past Medical History:   Diagnosis Date    Cancer     Skin    Depression     Erectile dysfunction     Obesity        Past Surgical History:  Past Surgical History:   Procedure Laterality Date     "ROTATOR CUFF REPAIR  2017    SQUAMOUS CELL CARCINOMA EXCISION  ,     VASECTOMY  2000       Social History:  Social History     Socioeconomic History    Marital status:    Tobacco Use    Smoking status: Former     Types: Cigars     Quit date: 2018     Years since quittin.3     Passive exposure: Never    Smokeless tobacco: Never    Tobacco comments:     No nicotine since    Substance and Sexual Activity    Alcohol use: No    Drug use: No    Sexual activity: Not Currently     Partners: Female     Birth control/protection: Surgical     Comment:        Family History:  Family History   Problem Relation Age of Onset    Obesity Mother     Depression Mother     No Known Problems Brother     No Known Problems Daughter          Vital Signs:   /70   Pulse 70   Temp 98 °F (36.7 °C) (Infrared)   Ht 177 cm (69.69\")   Wt 86.1 kg (189 lb 12.8 oz)   BMI 27.48 kg/m²      Physical Exam:  Physical Exam  Constitutional:       Appearance: He is not ill-appearing.   Cardiovascular:      Rate and Rhythm: Normal rate.      Pulses: Normal pulses.   Pulmonary:      Effort: Pulmonary effort is normal.      Breath sounds: Normal breath sounds.   Neurological:      General: No focal deficit present.      Mental Status: He is alert. Mental status is at baseline.               PHQ-9 Score  PHQ-9 Total Score:       Lab Review  No visits with results within 3 Month(s) from this visit.   Latest known visit with results is:   Lab on 2023   Component Date Value Ref Range Status    Glucose 2023 80  65 - 99 mg/dL Final    BUN 2023 17  6 - 20 mg/dL Final    Creatinine 2023 1.00  0.76 - 1.27 mg/dL Final    Sodium 2023 138  136 - 145 mmol/L Final    Potassium 2023 4.1  3.5 - 5.2 mmol/L Final    Chloride 2023 103  98 - 107 mmol/L Final    CO2 2023 24.0  22.0 - 29.0 mmol/L Final    Calcium 2023 9.5  8.6 - 10.5 mg/dL Final    Total Protein 2023 7.0  6.0 " - 8.5 g/dL Final    Albumin 07/24/2023 4.5  3.5 - 5.2 g/dL Final    ALT (SGPT) 07/24/2023 30  1 - 41 U/L Final    AST (SGOT) 07/24/2023 24  1 - 40 U/L Final    Alkaline Phosphatase 07/24/2023 63  39 - 117 U/L Final    Total Bilirubin 07/24/2023 0.2  0.0 - 1.2 mg/dL Final    Globulin 07/24/2023 2.5  gm/dL Final    A/G Ratio 07/24/2023 1.8  g/dL Final    BUN/Creatinine Ratio 07/24/2023 17.0  7.0 - 25.0 Final    Anion Gap 07/24/2023 11.0  5.0 - 15.0 mmol/L Final    eGFR 07/24/2023 86.7  >60.0 mL/min/1.73 Final    TSH 07/24/2023 2.130  0.270 - 4.200 uIU/mL Final    Testosterone, Total 07/24/2023 347  264 - 916 ng/dL Final    Adult male reference interval is based on a population of  healthy nonobese males (BMI <30) between 19 and 39 years old.  Sussy, et.al. JCEM 2017,102;4263-3469. PMID: [id number].    Testosterone, Free 07/24/2023 4.6 (L)  7.2 - 24.0 pg/mL Final    Total Cholesterol 07/24/2023 226 (H)  0 - 200 mg/dL Final    Triglycerides 07/24/2023 291 (H)  0 - 150 mg/dL Final    HDL Cholesterol 07/24/2023 44  40 - 60 mg/dL Final    LDL Cholesterol  07/24/2023 130 (H)  0 - 100 mg/dL Final    VLDL Cholesterol 07/24/2023 52 (H)  5 - 40 mg/dL Final    LDL/HDL Ratio 07/24/2023 2.81   Final    WBC 07/24/2023 6.55  3.40 - 10.80 10*3/mm3 Final    RBC 07/24/2023 4.60  4.14 - 5.80 10*6/mm3 Final    Hemoglobin 07/24/2023 13.6  13.0 - 17.7 g/dL Final    Hematocrit 07/24/2023 40.5  37.5 - 51.0 % Final    MCV 07/24/2023 88.0  79.0 - 97.0 fL Final    MCH 07/24/2023 29.6  26.6 - 33.0 pg Final    MCHC 07/24/2023 33.6  31.5 - 35.7 g/dL Final    RDW 07/24/2023 13.6  12.3 - 15.4 % Final    RDW-SD 07/24/2023 43.4  37.0 - 54.0 fl Final    MPV 07/24/2023 11.1  6.0 - 12.0 fL Final    Platelets 07/24/2023 217  140 - 450 10*3/mm3 Final    Neutrophil % 07/24/2023 61.7  42.7 - 76.0 % Final    Lymphocyte % 07/24/2023 24.4  19.6 - 45.3 % Final    Monocyte % 07/24/2023 11.5  5.0 - 12.0 % Final    Eosinophil % 07/24/2023 1.4  0.3 - 6.2 % Final     Basophil % 07/24/2023 0.5  0.0 - 1.5 % Final    Immature Grans % 07/24/2023 0.5  0.0 - 0.5 % Final    Neutrophils, Absolute 07/24/2023 4.05  1.70 - 7.00 10*3/mm3 Final    Lymphocytes, Absolute 07/24/2023 1.60  0.70 - 3.10 10*3/mm3 Final    Monocytes, Absolute 07/24/2023 0.75  0.10 - 0.90 10*3/mm3 Final    Eosinophils, Absolute 07/24/2023 0.09  0.00 - 0.40 10*3/mm3 Final    Basophils, Absolute 07/24/2023 0.03  0.00 - 0.20 10*3/mm3 Final    Immature Grans, Absolute 07/24/2023 0.03  0.00 - 0.05 10*3/mm3 Final    nRBC 07/24/2023 0.2  0.0 - 0.2 /100 WBC Final        Radiology Results        Assessment / Plan         Assessment and Plan   Diagnoses and all orders for this visit:    1. Overweight with body mass index (BMI) of 27 to 27.9 in adult (Primary)  Assessment & Plan:  Patient's (Body mass index is 27.48 kg/m².) indicates that they are overweight without health conditions that include none . Weight is improving with treatment and lifestyle modifications. BMI  is above average; BMI management plan is completed. We discussed low calorie, low carb based diet program, portion control, increasing exercise, and MUCH improved. Will do trial off of Phentermine as he has been taking it for 4 months .       2. Testicular hypofunction  Assessment & Plan:  Energy has been much improved.  He is due for repeat labs.  He is tolerating medication well unable to self administer.    Orders:  -     CBC & Differential; Future  -     Testosterone, Free, Total; Future  -     Testosterone; Future  -     PSA SCREENING; Future  -     Testosterone Cypionate 200 MG/ML solution; Inject 1 mL as directed Every 14 (Fourteen) Days.  Dispense: 2 mL; Refill: 2    3. Screening for malignant neoplasm of prostate  -     PSA SCREENING; Future    4. Therapeutic drug monitoring  -     Compliance Drug Analysis, Ur - Urine, Clean Catch; Future        Discussed possible differential diagnoses, testing, treatment, recommended non-pharmacological  interventions, risks, warning signs to monitor for that would indicate need for follow-up in clinic or ER. If no improvement with these regimens or you have new or worsening symptoms follow-up. Patient verbalizes understanding and agreement with plan of care. Denies further needs or concerns.     Patient was given instructions and counseling regarding her condition and for health maintenance advised.        Health Maintenance  Health Maintenance:   There are no preventive care reminders to display for this patient.       Meds ordered during this visit  New Medications Ordered This Visit   Medications    Testosterone Cypionate 200 MG/ML solution     Sig: Inject 1 mL as directed Every 14 (Fourteen) Days.     Dispense:  2 mL     Refill:  2       Meds stopped during this visit:  Medications Discontinued During This Encounter   Medication Reason    Testosterone Cypionate 200 MG/ML solution Reorder    phentermine (ADIPEX-P) 37.5 MG tablet         Visit Diagnoses    ICD-10-CM ICD-9-CM   1. Overweight with body mass index (BMI) of 27 to 27.9 in adult  E66.3 278.02    Z68.27 V85.23   2. Testicular hypofunction  E29.1 257.2   3. Screening for malignant neoplasm of prostate  Z12.5 V76.44   4. Therapeutic drug monitoring  Z51.81 V58.83       Patient was given instructions and counseling regarding his condition or for health maintenance advice. Please see specific information pulled into the AVS if appropriate.     Follow Up   Return in about 3 months (around 2/28/2024) for Recheck.          This document has been electronically signed by Renu Barboza DO   November 28, 2023 09:57 EST    Dictated Utilizing Dragon Dictation: Part of this note may be an electronic transcription/translation of spoken language to printed text using the Dragon Dictation System.    Renu Barboza D.O.  [facility] [place]

## 2023-11-28 NOTE — ASSESSMENT & PLAN NOTE
Patient's (Body mass index is 27.48 kg/m².) indicates that they are overweight without health conditions that include none . Weight is improving with treatment and lifestyle modifications. BMI  is above average; BMI management plan is completed. We discussed low calorie, low carb based diet program, portion control, increasing exercise, and MUCH improved. Will do trial off of Phentermine as he has been taking it for 4 months .

## 2023-11-30 ENCOUNTER — LAB (OUTPATIENT)
Dept: LAB | Facility: HOSPITAL | Age: 58
End: 2023-11-30
Payer: COMMERCIAL

## 2023-11-30 DIAGNOSIS — Z12.5 SCREENING FOR MALIGNANT NEOPLASM OF PROSTATE: ICD-10-CM

## 2023-11-30 DIAGNOSIS — E29.1 TESTICULAR HYPOFUNCTION: ICD-10-CM

## 2023-11-30 LAB
BASOPHILS # BLD AUTO: 0.04 10*3/MM3 (ref 0–0.2)
BASOPHILS NFR BLD AUTO: 0.6 % (ref 0–1.5)
DEPRECATED RDW RBC AUTO: 41.8 FL (ref 37–54)
EOSINOPHIL # BLD AUTO: 0.15 10*3/MM3 (ref 0–0.4)
EOSINOPHIL NFR BLD AUTO: 2.3 % (ref 0.3–6.2)
ERYTHROCYTE [DISTWIDTH] IN BLOOD BY AUTOMATED COUNT: 13.2 % (ref 12.3–15.4)
HCT VFR BLD AUTO: 44.6 % (ref 37.5–51)
HGB BLD-MCNC: 14.5 G/DL (ref 13–17.7)
IMM GRANULOCYTES # BLD AUTO: 0.01 10*3/MM3 (ref 0–0.05)
IMM GRANULOCYTES NFR BLD AUTO: 0.2 % (ref 0–0.5)
LYMPHOCYTES # BLD AUTO: 1.56 10*3/MM3 (ref 0.7–3.1)
LYMPHOCYTES NFR BLD AUTO: 23.6 % (ref 19.6–45.3)
MCH RBC QN AUTO: 28.2 PG (ref 26.6–33)
MCHC RBC AUTO-ENTMCNC: 32.5 G/DL (ref 31.5–35.7)
MCV RBC AUTO: 86.6 FL (ref 79–97)
MONOCYTES # BLD AUTO: 0.6 10*3/MM3 (ref 0.1–0.9)
MONOCYTES NFR BLD AUTO: 9.1 % (ref 5–12)
NEUTROPHILS NFR BLD AUTO: 4.24 10*3/MM3 (ref 1.7–7)
NEUTROPHILS NFR BLD AUTO: 64.2 % (ref 42.7–76)
NRBC BLD AUTO-RTO: 0 /100 WBC (ref 0–0.2)
PLATELET # BLD AUTO: 289 10*3/MM3 (ref 140–450)
PMV BLD AUTO: 10.6 FL (ref 6–12)
PSA SERPL-MCNC: 0.61 NG/ML (ref 0–4)
RBC # BLD AUTO: 5.15 10*6/MM3 (ref 4.14–5.8)
WBC NRBC COR # BLD AUTO: 6.6 10*3/MM3 (ref 3.4–10.8)

## 2023-11-30 PROCEDURE — 85025 COMPLETE CBC W/AUTO DIFF WBC: CPT

## 2023-11-30 PROCEDURE — 36415 COLL VENOUS BLD VENIPUNCTURE: CPT

## 2023-11-30 PROCEDURE — 84402 ASSAY OF FREE TESTOSTERONE: CPT

## 2023-11-30 PROCEDURE — 84403 ASSAY OF TOTAL TESTOSTERONE: CPT

## 2023-11-30 PROCEDURE — G0103 PSA SCREENING: HCPCS

## 2023-12-04 LAB — DRUGS UR: NORMAL

## 2023-12-05 LAB
TESTOST FREE SERPL-MCNC: 15.5 PG/ML (ref 7.2–24)
TESTOST SERPL-MCNC: 1222 NG/DL (ref 264–916)

## 2024-05-06 RX ORDER — BUPROPION HYDROCHLORIDE 150 MG/1
150 TABLET ORAL DAILY
Qty: 30 TABLET | Refills: 2 | Status: SHIPPED | OUTPATIENT
Start: 2024-05-06

## 2024-05-21 ENCOUNTER — TELEPHONE (OUTPATIENT)
Dept: FAMILY MEDICINE CLINIC | Facility: CLINIC | Age: 59
End: 2024-05-21

## 2024-05-21 NOTE — TELEPHONE ENCOUNTER
"Caller: Irving Nguyễn II \"Stan\"    Relationship to patient: Self    Best call back number: [phone]    Type of visit: SAME DAY     Requested date: ASAP    Additional notes: LEFT ELBOW POSSIBLE TRICEPS TENONITIS WOULD LIKE TO BE SEEN ASAP, POSSIBLE GETTING A CORTIZONE SHOT.  NEXT APPOINTMENT WAS JUNE 20, 2024.           "

## 2024-06-20 ENCOUNTER — OFFICE VISIT (OUTPATIENT)
Dept: FAMILY MEDICINE CLINIC | Facility: CLINIC | Age: 59
End: 2024-06-20
Payer: COMMERCIAL

## 2024-06-20 VITALS
DIASTOLIC BLOOD PRESSURE: 84 MMHG | WEIGHT: 206 LBS | BODY MASS INDEX: 29.49 KG/M2 | HEIGHT: 70 IN | HEART RATE: 72 BPM | SYSTOLIC BLOOD PRESSURE: 140 MMHG | TEMPERATURE: 98.9 F

## 2024-06-20 DIAGNOSIS — E78.2 MIXED HYPERLIPIDEMIA: ICD-10-CM

## 2024-06-20 DIAGNOSIS — F32.9 REACTIVE DEPRESSION: Primary | ICD-10-CM

## 2024-06-20 DIAGNOSIS — E29.1 TESTICULAR HYPOFUNCTION: ICD-10-CM

## 2024-06-20 PROCEDURE — 99214 OFFICE O/P EST MOD 30 MIN: CPT | Performed by: FAMILY MEDICINE

## 2024-06-20 RX ORDER — BUPROPION HYDROCHLORIDE 300 MG/1
300 TABLET ORAL DAILY
Qty: 90 TABLET | Refills: 1 | Status: SHIPPED | OUTPATIENT
Start: 2024-06-20

## 2024-06-20 RX ORDER — TESTOSTERONE CYPIONATE 200 MG/ML
200 VIAL (ML) INTRAMUSCULAR
Qty: 2 ML | Refills: 2 | Status: SHIPPED | OUTPATIENT
Start: 2024-06-20

## 2024-06-20 NOTE — PROGRESS NOTES
Subjective     Chief Complaint  Depression (Discuss testosterone)    Subjective          Irving Nguyễn II is a 59 y.o. male who presents today to [facility] for follow up.    HPI:   Depression  His past medical history is significant for depression.       Mr. Michelle is a very pleasant 59 year old male who presents today to follow up on chronic issues.     He has a lot of life stressors lately with caring for his elderly parents. He is following with a therapist.  He is currently prescribed Wellbutrin 150 mg daily for his depressive symptoms.    He takes testosterone for replacement.  He reports when he first started it he saw significant benefit in his energy levels.  He is stable on the once monthly dosage.    His weight has went up a little bit from previous.  He did very well on phentermine in the past for weight loss.    The following portions of the patient's history were reviewed and updated as appropriate: allergies, current medications, past family history, past medical history, past social history, past surgical history and problem list.    Objective     Objective     Allergy:   No Known Allergies     Current Medications:   Current Outpatient Medications   Medication Sig Dispense Refill    buPROPion XL (Wellbutrin XL) 300 MG 24 hr tablet Take 1 tablet by mouth Daily. 90 tablet 1    Testosterone Cypionate 200 MG/ML solution Inject 1 mL as directed Every 30 (Thirty) Days. 2 mL 2     No current facility-administered medications for this visit.       Past Medical History:  Past Medical History:   Diagnosis Date    Cancer     Skin    Depression     Erectile dysfunction     Obesity        Past Surgical History:  Past Surgical History:   Procedure Laterality Date    ROTATOR CUFF REPAIR  11/2017    SQUAMOUS CELL CARCINOMA EXCISION  2007, 2008    VASECTOMY  2000       Social History:  Social History     Socioeconomic History    Marital status:    Tobacco Use    Smoking  "status: Former     Types: Cigars     Quit date: 2018     Years since quittin.8     Passive exposure: Never    Smokeless tobacco: Never    Tobacco comments:     No nicotine since 2017   Vaping Use    Vaping status: Never Used   Substance and Sexual Activity    Alcohol use: No    Drug use: No    Sexual activity: Not Currently     Partners: Female     Birth control/protection: Surgical     Comment:        Family History:  Family History   Problem Relation Age of Onset    Obesity Mother     Depression Mother     No Known Problems Brother     No Known Problems Daughter        Vital Signs:   /84   Pulse 72   Temp 98.9 °F (37.2 °C)   Ht 177 cm (69.69\")   Wt 93.4 kg (206 lb)   BMI 29.83 kg/m²      Physical Exam:  Physical Exam  Vitals reviewed.   Constitutional:       Appearance: He is not ill-appearing.   Eyes:      Pupils: Pupils are equal, round, and reactive to light.   Cardiovascular:      Rate and Rhythm: Normal rate.      Pulses: Normal pulses.   Pulmonary:      Effort: Pulmonary effort is normal.      Breath sounds: Normal breath sounds.   Neurological:      General: No focal deficit present.      Mental Status: He is alert. Mental status is at baseline.   Psychiatric:         Mood and Affect: Mood normal.         Behavior: Behavior normal.         Thought Content: Thought content normal.         Judgment: Judgment normal.               PHQ-9 Score  PHQ-9 Total Score: 0     Lab Review  No visits with results within 3 Month(s) from this visit.   Latest known visit with results is:   Lab on 2023   Component Date Value Ref Range Status    Testosterone, Total 2023 1222 (H)  264 - 916 ng/dL Final    Adult male reference interval is based on a population of  healthy nonobese males (BMI <30) between 19 and 39 years old.  blake Hi.al. JCEM 2017,102;7974-1400. PMID: [id number].    Testosterone, Free 2023 15.5  7.2 - 24.0 pg/mL Final    PSA 2023 0.610  0.000 - 4.000 ng/mL " Final    WBC 11/30/2023 6.60  3.40 - 10.80 10*3/mm3 Final    RBC 11/30/2023 5.15  4.14 - 5.80 10*6/mm3 Final    Hemoglobin 11/30/2023 14.5  13.0 - 17.7 g/dL Final    Hematocrit 11/30/2023 44.6  37.5 - 51.0 % Final    MCV 11/30/2023 86.6  79.0 - 97.0 fL Final    MCH 11/30/2023 28.2  26.6 - 33.0 pg Final    MCHC 11/30/2023 32.5  31.5 - 35.7 g/dL Final    RDW 11/30/2023 13.2  12.3 - 15.4 % Final    RDW-SD 11/30/2023 41.8  37.0 - 54.0 fl Final    MPV 11/30/2023 10.6  6.0 - 12.0 fL Final    Platelets 11/30/2023 289  140 - 450 10*3/mm3 Final    Neutrophil % 11/30/2023 64.2  42.7 - 76.0 % Final    Lymphocyte % 11/30/2023 23.6  19.6 - 45.3 % Final    Monocyte % 11/30/2023 9.1  5.0 - 12.0 % Final    Eosinophil % 11/30/2023 2.3  0.3 - 6.2 % Final    Basophil % 11/30/2023 0.6  0.0 - 1.5 % Final    Immature Grans % 11/30/2023 0.2  0.0 - 0.5 % Final    Neutrophils, Absolute 11/30/2023 4.24  1.70 - 7.00 10*3/mm3 Final    Lymphocytes, Absolute 11/30/2023 1.56  0.70 - 3.10 10*3/mm3 Final    Monocytes, Absolute 11/30/2023 0.60  0.10 - 0.90 10*3/mm3 Final    Eosinophils, Absolute 11/30/2023 0.15  0.00 - 0.40 10*3/mm3 Final    Basophils, Absolute 11/30/2023 0.04  0.00 - 0.20 10*3/mm3 Final    Immature Grans, Absolute 11/30/2023 0.01  0.00 - 0.05 10*3/mm3 Final    nRBC 11/30/2023 0.0  0.0 - 0.2 /100 WBC Final        Radiology Results        Assessment / Plan         Assessment and Plan   Diagnoses and all orders for this visit:    1. Reactive depression (Primary)  -     buPROPion XL (Wellbutrin XL) 300 MG 24 hr tablet; Take 1 tablet by mouth Daily.  Dispense: 90 tablet; Refill: 1  -     CBC & Differential; Future  -     Comprehensive Metabolic Panel; Future  -     TSH Rfx On Abnormal To Free T4; Future    2. Mixed hyperlipidemia  -     Comprehensive Metabolic Panel; Future  -     Lipid Panel; Future    3. Testicular hypofunction  -     Testosterone Cypionate 200 MG/ML solution; Inject 1 mL as directed Every 30 (Thirty) Days.  Dispense:  2 mL; Refill: 2  -     Testosterone, Free, Total; Future  -     Testosterone; Future        Discussed possible differential diagnoses, testing, treatment, recommended non-pharmacological interventions, risks, warning signs to monitor for that would indicate need for follow-up in clinic or ER. If no improvement with these regimens or you have new or worsening symptoms follow-up. Patient verbalizes understanding and agreement with plan of care. Denies further needs or concerns.     Patient was given instructions and counseling regarding her condition and for health maintenance advised.            BMI is >= 25 and <30. (Overweight) The following options were offered after discussion;: weight loss educational material (shared in after visit summary)           Health Maintenance  Health Maintenance:   Health Maintenance Due   Topic Date Due    ANNUAL PHYSICAL  01/06/2024        Meds ordered during this visit  New Medications Ordered This Visit   Medications    Testosterone Cypionate 200 MG/ML solution     Sig: Inject 1 mL as directed Every 30 (Thirty) Days.     Dispense:  2 mL     Refill:  2    buPROPion XL (Wellbutrin XL) 300 MG 24 hr tablet     Sig: Take 1 tablet by mouth Daily.     Dispense:  90 tablet     Refill:  1       Meds stopped during this visit:  Medications Discontinued During This Encounter   Medication Reason    sildenafil (REVATIO) 20 MG tablet     Testosterone Cypionate 200 MG/ML solution     buPROPion XL (Wellbutrin XL) 150 MG 24 hr tablet Reorder        Visit Diagnoses    ICD-10-CM ICD-9-CM   1. Reactive depression  F32.9 300.4   2. Mixed hyperlipidemia  E78.2 272.2   3. Testicular hypofunction  E29.1 257.2       Patient was given instructions and counseling regarding his condition or for health maintenance advice. Please see specific information pulled into the AVS if appropriate.     Follow Up   Return in about 3 months (around 9/20/2024) for followup Blood pressure and testosterone .          This  document has been electronically signed by Renu Barboza DO   June 20, 2024 17:13 EDT    Dictated Utilizing Dragon Dictation: Part of this note may be an electronic transcription/translation of spoken language to printed text using the Dragon Dictation System.    Renu Barboza D.O.  [facility]

## 2024-06-21 NOTE — ASSESSMENT & PLAN NOTE
Energy has been much improved.  He is due for repeat labs.  He is tolerating medication well and able to self administer.    The patient has read and signed the [facility] Controlled Substance Contract.  I will continue to see patient for regular follow up appointments.  They are well controlled on their medication.  HAL is updated every 3 months. The patient is aware of the potential for addiction and dependence.

## 2024-06-21 NOTE — ASSESSMENT & PLAN NOTE
Patient's depression is recurrent and is moderate without psychosis. Their depression is currently active and the condition is improving with treatment. This will be reassessed in 3 months. F/U as described:patient will continue current medication therapy   Increase Wellbutrin to 300 mg daily.

## 2024-06-21 NOTE — ASSESSMENT & PLAN NOTE
Lipid abnormalities are stable      Counseled patient on lifestyle modifications to help control hyperlipidemia.     Patient Treatment Goals:     LDL goal is under 100    Followup in 6 months.

## 2024-06-27 ENCOUNTER — LAB (OUTPATIENT)
Dept: LAB | Facility: HOSPITAL | Age: 59
End: 2024-06-27
Payer: COMMERCIAL

## 2024-06-27 DIAGNOSIS — E29.1 TESTICULAR HYPOFUNCTION: ICD-10-CM

## 2024-06-27 DIAGNOSIS — F32.9 REACTIVE DEPRESSION: ICD-10-CM

## 2024-06-27 DIAGNOSIS — E78.2 MIXED HYPERLIPIDEMIA: ICD-10-CM

## 2024-06-27 LAB
ALBUMIN SERPL-MCNC: 4.4 G/DL (ref 3.5–5.2)
ALBUMIN/GLOB SERPL: 1.6 G/DL
ALP SERPL-CCNC: 50 U/L (ref 39–117)
ALT SERPL W P-5'-P-CCNC: 24 U/L (ref 1–41)
ANION GAP SERPL CALCULATED.3IONS-SCNC: 11.6 MMOL/L (ref 5–15)
AST SERPL-CCNC: 24 U/L (ref 1–40)
BASOPHILS # BLD AUTO: 0.03 10*3/MM3 (ref 0–0.2)
BASOPHILS NFR BLD AUTO: 0.4 % (ref 0–1.5)
BILIRUB SERPL-MCNC: 0.3 MG/DL (ref 0–1.2)
BUN SERPL-MCNC: 18 MG/DL (ref 8–23)
BUN/CREAT SERPL: 14.3 (ref 7–25)
CALCIUM SPEC-SCNC: 9.6 MG/DL (ref 8.6–10.5)
CHLORIDE SERPL-SCNC: 102 MMOL/L (ref 98–107)
CHOLEST SERPL-MCNC: 253 MG/DL (ref 0–200)
CO2 SERPL-SCNC: 24.4 MMOL/L (ref 22–29)
CREAT SERPL-MCNC: 1.26 MG/DL (ref 0.76–1.27)
DEPRECATED RDW RBC AUTO: 46.3 FL (ref 37–54)
EGFRCR SERPLBLD CKD-EPI 2021: 65.3 ML/MIN/1.73
EOSINOPHIL # BLD AUTO: 0.09 10*3/MM3 (ref 0–0.4)
EOSINOPHIL NFR BLD AUTO: 1.1 % (ref 0.3–6.2)
ERYTHROCYTE [DISTWIDTH] IN BLOOD BY AUTOMATED COUNT: 14 % (ref 12.3–15.4)
GLOBULIN UR ELPH-MCNC: 2.8 GM/DL
GLUCOSE SERPL-MCNC: 87 MG/DL (ref 65–99)
HCT VFR BLD AUTO: 44.1 % (ref 37.5–51)
HDLC SERPL-MCNC: 68 MG/DL (ref 40–60)
HGB BLD-MCNC: 15.2 G/DL (ref 13–17.7)
IMM GRANULOCYTES # BLD AUTO: 0.04 10*3/MM3 (ref 0–0.05)
IMM GRANULOCYTES NFR BLD AUTO: 0.5 % (ref 0–0.5)
LDLC SERPL CALC-MCNC: 157 MG/DL (ref 0–100)
LDLC/HDLC SERPL: 2.26 {RATIO}
LYMPHOCYTES # BLD AUTO: 1.91 10*3/MM3 (ref 0.7–3.1)
LYMPHOCYTES NFR BLD AUTO: 23.8 % (ref 19.6–45.3)
MCH RBC QN AUTO: 31.3 PG (ref 26.6–33)
MCHC RBC AUTO-ENTMCNC: 34.5 G/DL (ref 31.5–35.7)
MCV RBC AUTO: 90.9 FL (ref 79–97)
MONOCYTES # BLD AUTO: 0.83 10*3/MM3 (ref 0.1–0.9)
MONOCYTES NFR BLD AUTO: 10.3 % (ref 5–12)
NEUTROPHILS NFR BLD AUTO: 5.12 10*3/MM3 (ref 1.7–7)
NEUTROPHILS NFR BLD AUTO: 63.9 % (ref 42.7–76)
NRBC BLD AUTO-RTO: 0 /100 WBC (ref 0–0.2)
PLATELET # BLD AUTO: 212 10*3/MM3 (ref 140–450)
PMV BLD AUTO: 10.9 FL (ref 6–12)
POTASSIUM SERPL-SCNC: 4.5 MMOL/L (ref 3.5–5.2)
PROT SERPL-MCNC: 7.2 G/DL (ref 6–8.5)
RBC # BLD AUTO: 4.85 10*6/MM3 (ref 4.14–5.8)
SODIUM SERPL-SCNC: 138 MMOL/L (ref 136–145)
TRIGL SERPL-MCNC: 158 MG/DL (ref 0–150)
TSH SERPL DL<=0.05 MIU/L-ACNC: 3.5 UIU/ML (ref 0.27–4.2)
VLDLC SERPL-MCNC: 28 MG/DL (ref 5–40)
WBC NRBC COR # BLD AUTO: 8.02 10*3/MM3 (ref 3.4–10.8)

## 2024-06-27 PROCEDURE — 84402 ASSAY OF FREE TESTOSTERONE: CPT

## 2024-06-27 PROCEDURE — 80050 GENERAL HEALTH PANEL: CPT

## 2024-06-27 PROCEDURE — 84403 ASSAY OF TOTAL TESTOSTERONE: CPT

## 2024-06-27 PROCEDURE — 36415 COLL VENOUS BLD VENIPUNCTURE: CPT

## 2024-06-27 PROCEDURE — 80061 LIPID PANEL: CPT

## 2024-06-30 LAB
TESTOST FREE SERPL-MCNC: 5.9 PG/ML (ref 6.6–18.1)
TESTOST SERPL-MCNC: 394 NG/DL (ref 264–916)

## 2024-08-09 ENCOUNTER — TELEPHONE (OUTPATIENT)
Dept: FAMILY MEDICINE CLINIC | Facility: CLINIC | Age: 59
End: 2024-08-09

## 2024-08-09 DIAGNOSIS — E29.1 TESTICULAR HYPOFUNCTION: ICD-10-CM

## 2024-08-09 RX ORDER — TESTOSTERONE CYPIONATE 200 MG/ML
200 VIAL (ML) INTRAMUSCULAR
Qty: 2 ML | Refills: 2 | Status: SHIPPED | OUTPATIENT
Start: 2024-08-09

## 2024-08-09 NOTE — TELEPHONE ENCOUNTER
"Caller: Irving Nguyễn II \"Stan\"    Relationship: Self    Best call back number: [phone]    Which medication are you concerned about: TESTOSTERONE     Who prescribed you this medication: DR BRITT    What are your concerns: PATIENT WAS TOLD TO INCREASE MEDICATION TO 1ML EVERY 2 WEEKS SO PATIENT NEEDS NEW PRESCRIPTION WITH NEW DIRECTIONS SENT TO [facility]. PLEASE ADVISE    "

## 2024-09-20 ENCOUNTER — LAB (OUTPATIENT)
Dept: LAB | Facility: HOSPITAL | Age: 59
End: 2024-09-20
Payer: COMMERCIAL

## 2024-09-20 ENCOUNTER — OFFICE VISIT (OUTPATIENT)
Dept: FAMILY MEDICINE CLINIC | Facility: CLINIC | Age: 59
End: 2024-09-20
Payer: COMMERCIAL

## 2024-09-20 VITALS
BODY MASS INDEX: 30.92 KG/M2 | SYSTOLIC BLOOD PRESSURE: 130 MMHG | HEART RATE: 82 BPM | HEIGHT: 70 IN | DIASTOLIC BLOOD PRESSURE: 76 MMHG | WEIGHT: 216 LBS | TEMPERATURE: 98.2 F

## 2024-09-20 DIAGNOSIS — E29.1 TESTICULAR HYPOFUNCTION: ICD-10-CM

## 2024-09-20 DIAGNOSIS — Z00.00 ENCOUNTER FOR ANNUAL PHYSICAL EXAM: Primary | ICD-10-CM

## 2024-09-20 DIAGNOSIS — E78.2 MIXED HYPERLIPIDEMIA: ICD-10-CM

## 2024-09-20 DIAGNOSIS — Z00.00 ENCOUNTER FOR ANNUAL PHYSICAL EXAM: ICD-10-CM

## 2024-09-20 DIAGNOSIS — E66.9 CLASS 1 OBESITY WITH SERIOUS COMORBIDITY AND BODY MASS INDEX (BMI) OF 31.0 TO 31.9 IN ADULT, UNSPECIFIED OBESITY TYPE: ICD-10-CM

## 2024-09-20 DIAGNOSIS — F32.9 REACTIVE DEPRESSION: ICD-10-CM

## 2024-09-20 DIAGNOSIS — N52.9 ERECTILE DYSFUNCTION, UNSPECIFIED ERECTILE DYSFUNCTION TYPE: ICD-10-CM

## 2024-09-20 LAB
ALBUMIN SERPL-MCNC: 4.3 G/DL (ref 3.5–5.2)
ALBUMIN/GLOB SERPL: 1.7 G/DL
ALP SERPL-CCNC: 51 U/L (ref 39–117)
ALT SERPL W P-5'-P-CCNC: 25 U/L (ref 1–41)
ANION GAP SERPL CALCULATED.3IONS-SCNC: 10 MMOL/L (ref 5–15)
AST SERPL-CCNC: 22 U/L (ref 1–40)
BASOPHILS # BLD AUTO: 0.07 10*3/MM3 (ref 0–0.2)
BASOPHILS NFR BLD AUTO: 0.8 % (ref 0–1.5)
BILIRUB SERPL-MCNC: 0.2 MG/DL (ref 0–1.2)
BUN SERPL-MCNC: 16 MG/DL (ref 8–23)
BUN/CREAT SERPL: 9.4 (ref 7–25)
CALCIUM SPEC-SCNC: 9.8 MG/DL (ref 8.6–10.5)
CHLORIDE SERPL-SCNC: 100 MMOL/L (ref 98–107)
CHOLEST SERPL-MCNC: 208 MG/DL (ref 0–200)
CO2 SERPL-SCNC: 25 MMOL/L (ref 22–29)
CREAT SERPL-MCNC: 1.7 MG/DL (ref 0.76–1.27)
DEPRECATED RDW RBC AUTO: 43.8 FL (ref 37–54)
EGFRCR SERPLBLD CKD-EPI 2021: 45.6 ML/MIN/1.73
EOSINOPHIL # BLD AUTO: 0.2 10*3/MM3 (ref 0–0.4)
EOSINOPHIL NFR BLD AUTO: 2.4 % (ref 0.3–6.2)
ERYTHROCYTE [DISTWIDTH] IN BLOOD BY AUTOMATED COUNT: 12.8 % (ref 12.3–15.4)
GLOBULIN UR ELPH-MCNC: 2.5 GM/DL
GLUCOSE SERPL-MCNC: 86 MG/DL (ref 65–99)
HBA1C MFR BLD: 5.7 % (ref 4.8–5.6)
HCT VFR BLD AUTO: 44 % (ref 37.5–51)
HDLC SERPL-MCNC: 46 MG/DL (ref 40–60)
HGB BLD-MCNC: 14.8 G/DL (ref 13–17.7)
IMM GRANULOCYTES # BLD AUTO: 0.03 10*3/MM3 (ref 0–0.05)
IMM GRANULOCYTES NFR BLD AUTO: 0.4 % (ref 0–0.5)
LDLC SERPL CALC-MCNC: 141 MG/DL (ref 0–100)
LDLC/HDLC SERPL: 3.03 {RATIO}
LYMPHOCYTES # BLD AUTO: 1.68 10*3/MM3 (ref 0.7–3.1)
LYMPHOCYTES NFR BLD AUTO: 20.4 % (ref 19.6–45.3)
MCH RBC QN AUTO: 31.4 PG (ref 26.6–33)
MCHC RBC AUTO-ENTMCNC: 33.6 G/DL (ref 31.5–35.7)
MCV RBC AUTO: 93.2 FL (ref 79–97)
MONOCYTES # BLD AUTO: 0.86 10*3/MM3 (ref 0.1–0.9)
MONOCYTES NFR BLD AUTO: 10.4 % (ref 5–12)
NEUTROPHILS NFR BLD AUTO: 5.41 10*3/MM3 (ref 1.7–7)
NEUTROPHILS NFR BLD AUTO: 65.6 % (ref 42.7–76)
NRBC BLD AUTO-RTO: 0 /100 WBC (ref 0–0.2)
PLATELET # BLD AUTO: 256 10*3/MM3 (ref 140–450)
PMV BLD AUTO: 10.7 FL (ref 6–12)
POTASSIUM SERPL-SCNC: 4.8 MMOL/L (ref 3.5–5.2)
PROT SERPL-MCNC: 6.8 G/DL (ref 6–8.5)
RBC # BLD AUTO: 4.72 10*6/MM3 (ref 4.14–5.8)
SODIUM SERPL-SCNC: 135 MMOL/L (ref 136–145)
TRIGL SERPL-MCNC: 114 MG/DL (ref 0–150)
TSH SERPL DL<=0.05 MIU/L-ACNC: 2.52 UIU/ML (ref 0.27–4.2)
VLDLC SERPL-MCNC: 21 MG/DL (ref 5–40)
WBC NRBC COR # BLD AUTO: 8.25 10*3/MM3 (ref 3.4–10.8)

## 2024-09-20 PROCEDURE — 99396 PREV VISIT EST AGE 40-64: CPT | Performed by: FAMILY MEDICINE

## 2024-09-20 PROCEDURE — 80061 LIPID PANEL: CPT

## 2024-09-20 PROCEDURE — 36415 COLL VENOUS BLD VENIPUNCTURE: CPT

## 2024-09-20 PROCEDURE — 84402 ASSAY OF FREE TESTOSTERONE: CPT

## 2024-09-20 PROCEDURE — 84403 ASSAY OF TOTAL TESTOSTERONE: CPT

## 2024-09-20 PROCEDURE — 80050 GENERAL HEALTH PANEL: CPT

## 2024-09-20 PROCEDURE — 83036 HEMOGLOBIN GLYCOSYLATED A1C: CPT

## 2024-09-20 RX ORDER — SILDENAFIL 50 MG/1
50 TABLET, FILM COATED ORAL DAILY PRN
Qty: 30 TABLET | Refills: 0 | Status: SHIPPED | OUTPATIENT
Start: 2024-09-20

## 2024-09-20 RX ORDER — TESTOSTERONE CYPIONATE 200 MG/ML
200 VIAL (ML) INTRAMUSCULAR
Qty: 2 ML | Refills: 2 | Status: SHIPPED | OUTPATIENT
Start: 2024-09-20

## 2024-09-20 RX ORDER — SEMAGLUTIDE 0.25 MG/.5ML
0.25 INJECTION, SOLUTION SUBCUTANEOUS WEEKLY
Qty: 2 ML | Refills: 0 | Status: SHIPPED | OUTPATIENT
Start: 2024-09-20 | End: 2024-10-18

## 2024-09-23 DIAGNOSIS — R79.89 ELEVATED SERUM CREATININE: Primary | ICD-10-CM

## 2024-09-24 DIAGNOSIS — E66.9 CLASS 1 OBESITY WITH SERIOUS COMORBIDITY AND BODY MASS INDEX (BMI) OF 31.0 TO 31.9 IN ADULT, UNSPECIFIED OBESITY TYPE: ICD-10-CM

## 2024-09-24 RX ORDER — SEMAGLUTIDE 0.25 MG/.5ML
0.25 INJECTION, SOLUTION SUBCUTANEOUS WEEKLY
Qty: 2 ML | Refills: 0 | Status: SHIPPED | OUTPATIENT
Start: 2024-09-24 | End: 2024-10-22

## 2024-09-26 LAB
TESTOST FREE SERPL-MCNC: 19.4 PG/ML (ref 6.6–18.1)
TESTOST SERPL-MCNC: 926 NG/DL (ref 264–916)

## 2024-10-11 ENCOUNTER — LAB (OUTPATIENT)
Dept: LAB | Facility: HOSPITAL | Age: 59
End: 2024-10-11
Payer: COMMERCIAL

## 2024-10-11 DIAGNOSIS — R79.89 ELEVATED SERUM CREATININE: ICD-10-CM

## 2024-10-11 LAB
ANION GAP SERPL CALCULATED.3IONS-SCNC: 13 MMOL/L (ref 5–15)
BUN SERPL-MCNC: 15 MG/DL (ref 8–23)
BUN/CREAT SERPL: 11.5 (ref 7–25)
CALCIUM SPEC-SCNC: 9.9 MG/DL (ref 8.6–10.5)
CHLORIDE SERPL-SCNC: 97 MMOL/L (ref 98–107)
CO2 SERPL-SCNC: 26 MMOL/L (ref 22–29)
CREAT SERPL-MCNC: 1.3 MG/DL (ref 0.76–1.27)
EGFRCR SERPLBLD CKD-EPI 2021: 62.9 ML/MIN/1.73
GLUCOSE SERPL-MCNC: 94 MG/DL (ref 65–99)
POTASSIUM SERPL-SCNC: 4.5 MMOL/L (ref 3.5–5.2)
SODIUM SERPL-SCNC: 136 MMOL/L (ref 136–145)

## 2024-10-11 PROCEDURE — 80048 BASIC METABOLIC PNL TOTAL CA: CPT

## 2024-10-11 PROCEDURE — 36415 COLL VENOUS BLD VENIPUNCTURE: CPT

## 2024-11-07 RX ORDER — SEMAGLUTIDE 1.34 MG/ML
0.5 INJECTION, SOLUTION SUBCUTANEOUS WEEKLY
Qty: 3 ML | Refills: 0 | Status: SHIPPED | OUTPATIENT
Start: 2024-11-07

## 2024-12-05 RX ORDER — SEMAGLUTIDE 1 MG/.5ML
1 INJECTION, SOLUTION SUBCUTANEOUS WEEKLY
Qty: 2 ML | Refills: 0 | Status: SHIPPED | OUTPATIENT
Start: 2024-12-05

## 2024-12-26 DIAGNOSIS — N52.9 ERECTILE DYSFUNCTION, UNSPECIFIED ERECTILE DYSFUNCTION TYPE: ICD-10-CM

## 2024-12-26 DIAGNOSIS — F32.9 REACTIVE DEPRESSION: ICD-10-CM

## 2024-12-26 RX ORDER — BUPROPION HYDROCHLORIDE 300 MG/1
300 TABLET ORAL DAILY
Qty: 90 TABLET | Refills: 1 | Status: SHIPPED | OUTPATIENT
Start: 2024-12-26

## 2024-12-26 RX ORDER — SILDENAFIL 50 MG/1
50 TABLET, FILM COATED ORAL DAILY PRN
Qty: 30 TABLET | Refills: 0 | Status: SHIPPED | OUTPATIENT
Start: 2024-12-26

## 2025-02-25 ENCOUNTER — OFFICE VISIT (OUTPATIENT)
Dept: FAMILY MEDICINE CLINIC | Facility: CLINIC | Age: 60
End: 2025-02-25
Payer: COMMERCIAL

## 2025-02-25 ENCOUNTER — LAB (OUTPATIENT)
Dept: LAB | Facility: HOSPITAL | Age: 60
End: 2025-02-25
Payer: COMMERCIAL

## 2025-02-25 VITALS
DIASTOLIC BLOOD PRESSURE: 80 MMHG | SYSTOLIC BLOOD PRESSURE: 130 MMHG | TEMPERATURE: 98 F | BODY MASS INDEX: 26.2 KG/M2 | HEIGHT: 70 IN | WEIGHT: 183 LBS | HEART RATE: 84 BPM

## 2025-02-25 DIAGNOSIS — E29.1 TESTICULAR HYPOFUNCTION: ICD-10-CM

## 2025-02-25 DIAGNOSIS — E78.2 MIXED HYPERLIPIDEMIA: Primary | ICD-10-CM

## 2025-02-25 DIAGNOSIS — E66.3 OVERWEIGHT WITH BODY MASS INDEX (BMI) OF 26 TO 26.9 IN ADULT: ICD-10-CM

## 2025-02-25 DIAGNOSIS — E78.2 MIXED HYPERLIPIDEMIA: ICD-10-CM

## 2025-02-25 DIAGNOSIS — F32.9 REACTIVE DEPRESSION: ICD-10-CM

## 2025-02-25 LAB
ALBUMIN SERPL-MCNC: 4.1 G/DL (ref 3.5–5.2)
ALBUMIN/GLOB SERPL: 1.2 G/DL
ALP SERPL-CCNC: 50 U/L (ref 39–117)
ALT SERPL W P-5'-P-CCNC: 21 U/L (ref 1–41)
ANION GAP SERPL CALCULATED.3IONS-SCNC: 11.8 MMOL/L (ref 5–15)
AST SERPL-CCNC: 28 U/L (ref 1–40)
BASOPHILS # BLD AUTO: 0.03 10*3/MM3 (ref 0–0.2)
BASOPHILS NFR BLD AUTO: 0.5 % (ref 0–1.5)
BILIRUB SERPL-MCNC: 0.3 MG/DL (ref 0–1.2)
BUN SERPL-MCNC: 12 MG/DL (ref 8–23)
BUN/CREAT SERPL: 10.3 (ref 7–25)
CALCIUM SPEC-SCNC: 10 MG/DL (ref 8.6–10.5)
CHLORIDE SERPL-SCNC: 102 MMOL/L (ref 98–107)
CHOLEST SERPL-MCNC: 203 MG/DL (ref 0–200)
CO2 SERPL-SCNC: 24.2 MMOL/L (ref 22–29)
CREAT SERPL-MCNC: 1.17 MG/DL (ref 0.76–1.27)
DEPRECATED RDW RBC AUTO: 43.4 FL (ref 37–54)
EGFRCR SERPLBLD CKD-EPI 2021: 70.9 ML/MIN/1.73
EOSINOPHIL # BLD AUTO: 0.06 10*3/MM3 (ref 0–0.4)
EOSINOPHIL NFR BLD AUTO: 1 % (ref 0.3–6.2)
ERYTHROCYTE [DISTWIDTH] IN BLOOD BY AUTOMATED COUNT: 13.6 % (ref 12.3–15.4)
GLOBULIN UR ELPH-MCNC: 3.4 GM/DL
GLUCOSE SERPL-MCNC: 79 MG/DL (ref 65–99)
HCT VFR BLD AUTO: 43.5 % (ref 37.5–51)
HDLC SERPL-MCNC: 54 MG/DL (ref 40–60)
HGB BLD-MCNC: 15.3 G/DL (ref 13–17.7)
IMM GRANULOCYTES # BLD AUTO: 0.02 10*3/MM3 (ref 0–0.05)
IMM GRANULOCYTES NFR BLD AUTO: 0.3 % (ref 0–0.5)
LDLC SERPL CALC-MCNC: 134 MG/DL (ref 0–100)
LDLC/HDLC SERPL: 2.46 {RATIO}
LYMPHOCYTES # BLD AUTO: 1.55 10*3/MM3 (ref 0.7–3.1)
LYMPHOCYTES NFR BLD AUTO: 25.4 % (ref 19.6–45.3)
MCH RBC QN AUTO: 31 PG (ref 26.6–33)
MCHC RBC AUTO-ENTMCNC: 35.2 G/DL (ref 31.5–35.7)
MCV RBC AUTO: 88.1 FL (ref 79–97)
MONOCYTES # BLD AUTO: 0.68 10*3/MM3 (ref 0.1–0.9)
MONOCYTES NFR BLD AUTO: 11.1 % (ref 5–12)
NEUTROPHILS NFR BLD AUTO: 3.76 10*3/MM3 (ref 1.7–7)
NEUTROPHILS NFR BLD AUTO: 61.7 % (ref 42.7–76)
NRBC BLD AUTO-RTO: 0 /100 WBC (ref 0–0.2)
PLATELET # BLD AUTO: 288 10*3/MM3 (ref 140–450)
PMV BLD AUTO: 10.6 FL (ref 6–12)
POTASSIUM SERPL-SCNC: 4.4 MMOL/L (ref 3.5–5.2)
PROT SERPL-MCNC: 7.5 G/DL (ref 6–8.5)
RBC # BLD AUTO: 4.94 10*6/MM3 (ref 4.14–5.8)
SODIUM SERPL-SCNC: 138 MMOL/L (ref 136–145)
TRIGL SERPL-MCNC: 82 MG/DL (ref 0–150)
VLDLC SERPL-MCNC: 15 MG/DL (ref 5–40)
WBC NRBC COR # BLD AUTO: 6.1 10*3/MM3 (ref 3.4–10.8)

## 2025-02-25 PROCEDURE — 36415 COLL VENOUS BLD VENIPUNCTURE: CPT

## 2025-02-25 PROCEDURE — 83036 HEMOGLOBIN GLYCOSYLATED A1C: CPT

## 2025-02-25 PROCEDURE — 84402 ASSAY OF FREE TESTOSTERONE: CPT

## 2025-02-25 PROCEDURE — 80050 GENERAL HEALTH PANEL: CPT

## 2025-02-25 PROCEDURE — 84403 ASSAY OF TOTAL TESTOSTERONE: CPT

## 2025-02-25 PROCEDURE — 99214 OFFICE O/P EST MOD 30 MIN: CPT | Performed by: FAMILY MEDICINE

## 2025-02-25 PROCEDURE — 80061 LIPID PANEL: CPT

## 2025-02-25 NOTE — ASSESSMENT & PLAN NOTE
Patient's depression is recurrent and is moderate without psychosis. Their depression is currently in partial remission and the condition is improving with treatment. This will be reassessed in 3 months. F/U as described:patient will continue current medication therapy   Continue Wellbutrin to 300 mg daily.

## 2025-02-25 NOTE — ASSESSMENT & PLAN NOTE
Energy has been much improved.  He is due for repeat labs.  He is tolerating medication well and able to self administer.    The patient has read and signed the [facility] Controlled Substance Contract.  I will continue to see patient for regular follow up appointments.  They are well controlled on their medication.  HAL is updated every 3 months. The patient is aware of the potential for addiction and dependence.

## 2025-02-25 NOTE — ASSESSMENT & PLAN NOTE
Lipid abnormalities are stable    Counseled patient on lifestyle modifications to help control hyperlipidemia.     Patient Treatment Goals:     LDL goal is under 100  FLP ordered today     Followup in 6 months.

## 2025-02-25 NOTE — PROGRESS NOTES
Subjective     Chief Complaint  Hyperlipidemia    Subjective          Irving Nguyễn II is a 60 y.o. male who presents today to [facility] for follow up.    HPI:   History of Present Illness    History of Present Illness  The patient is a very pleasant 60-year-old male who presents today to follow up.    He has had a  weight loss of 33 pounds, attributing this to his current medication regimen of Semaglutide 1.7 mg weekly, which he tolerates well. He maintains an active lifestyle, engaging in gym activities six days a week, and reports a healthy appetite. He finds the gym beneficial for his mental health.    He has been experiencing sleep disturbances for several years but prefers not to take any medication for it. He occasionally uses melatonin 5 mg to aid sleep. His sleep pattern typically involves approximately 3 hours of sleep, followed by a period of wakefulness during which he engages in work for about an hour, and then another 3 to 4 hours of sleep. This results in a total sleep duration of approximately 6 to 7 hours. Despite this fragmented sleep pattern, he reports feeling well-rested during the day and does not experience excessive fatigue. He consumes one cup of coffee in the morning. He does not report any urinary frequency at night. He acknowledges significant stressors in his life. He experiences occasional poor sleep nights, approximately every few weeks, but notes that he is able to recover from these episodes. Additionally, he is following with a therapist.  He is currently prescribed Wellbutrin 300 mg daily for his depressive symptoms.     He takes testosterone for replacement.   He is stable on the once monthly dosage.    MEDICATIONS  Current: Melatonin, semaglutide.      The following portions of the patient's history were reviewed and updated as appropriate: allergies, current medications, past family history, past medical history, past social history, past  surgical history and problem list.    Objective     Objective     Allergy:   No Known Allergies     Current Medications:   Current Outpatient Medications   Medication Sig Dispense Refill    buPROPion XL (Wellbutrin XL) 300 MG 24 hr tablet Take 1 tablet by mouth Daily. 90 tablet 1    Semaglutide-Weight Management 1.7 MG/0.75ML solution auto-injector Inject 0.42mL (1.7mg=42 units) subcutaneously once weekly for four (4) weeks. 2 mL 0    sildenafil (Viagra) 50 MG tablet Take 1 tablet by mouth Daily As Needed for Erectile Dysfunction. 30 tablet 0    Testosterone Cypionate 200 MG/ML solution Inject 1 mL as directed Every 14 (Fourteen) Days. 2 mL 2     No current facility-administered medications for this visit.       Past Medical History:  Past Medical History:   Diagnosis Date    Cancer     Skin    Depression     Erectile dysfunction     Obesity        Past Surgical History:  Past Surgical History:   Procedure Laterality Date    ROTATOR CUFF REPAIR  2017    SQUAMOUS CELL CARCINOMA EXCISION  ,     VASECTOMY         Social History:  Social History     Socioeconomic History    Marital status:    Tobacco Use    Smoking status: Former     Current packs/day: 0.00     Average packs/day: 0.5 packs/day for 34.6 years (17.3 ttl pk-yrs)     Types: Cigarettes, Cigars     Start date: 1984     Quit date: 2018     Years since quittin.5     Passive exposure: Never    Smokeless tobacco: Never    Tobacco comments:     No nicotine since    Vaping Use    Vaping status: Never Used   Substance and Sexual Activity    Alcohol use: No    Drug use: No    Sexual activity: Yes     Partners: Female     Birth control/protection: Vasectomy     Comment:        Family History:  Family History   Problem Relation Age of Onset    Obesity Mother     Depression Mother     No Known Problems Brother     No Known Problems Daughter            Vital Signs:   /80   Pulse 84   Temp 98 °F (36.7 °C) (Infrared)    "Ht 177 cm (69.69\")   Wt 83 kg (183 lb)   BMI 26.50 kg/m²      Physical Exam:  Physical Exam  Vitals reviewed.   Constitutional:       Appearance: He is not ill-appearing.   Eyes:      Pupils: Pupils are equal, round, and reactive to light.   Cardiovascular:      Rate and Rhythm: Normal rate.      Pulses: Normal pulses.   Pulmonary:      Effort: Pulmonary effort is normal.      Breath sounds: Normal breath sounds.   Neurological:      General: No focal deficit present.      Mental Status: He is alert. Mental status is at baseline.   Psychiatric:         Mood and Affect: Mood normal.         Behavior: Behavior normal.         Thought Content: Thought content normal.         Judgment: Judgment normal.               PHQ-9 Score  PHQ-9 Total Score:      Lab Review  No visits with results within 3 Month(s) from this visit.   Latest known visit with results is:   Lab on 10/11/2024   Component Date Value Ref Range Status    Glucose 10/11/2024 94  65 - 99 mg/dL Final    BUN 10/11/2024 15  8 - 23 mg/dL Final    Creatinine 10/11/2024 1.30 (H)  0.76 - 1.27 mg/dL Final    Sodium 10/11/2024 136  136 - 145 mmol/L Final    Potassium 10/11/2024 4.5  3.5 - 5.2 mmol/L Final    Chloride 10/11/2024 97 (L)  98 - 107 mmol/L Final    CO2 10/11/2024 26.0  22.0 - 29.0 mmol/L Final    Calcium 10/11/2024 9.9  8.6 - 10.5 mg/dL Final    BUN/Creatinine Ratio 10/11/2024 11.5  7.0 - 25.0 Final    Anion Gap 10/11/2024 13.0  5.0 - 15.0 mmol/L Final    eGFR 10/11/2024 62.9  >60.0 mL/min/1.73 Final        Radiology Results        Assessment / Plan         Assessment and Plan   Diagnoses and all orders for this visit:    1. Mixed hyperlipidemia (Primary)  Assessment & Plan:   Lipid abnormalities are stable    Counseled patient on lifestyle modifications to help control hyperlipidemia.     Patient Treatment Goals:     LDL goal is under 100  FLP ordered today     Followup in 6 months.    Orders:  -     CBC & Differential; Future  -     Comprehensive " Metabolic Panel; Future  -     Lipid Panel; Future    2. Testicular hypofunction  Assessment & Plan:  Energy has been much improved.  He is due for repeat labs.  He is tolerating medication well and able to self administer.    The patient has read and signed the [facility] Controlled Substance Contract.  I will continue to see patient for regular follow up appointments.  They are well controlled on their medication.  HAL is updated every 3 months. The patient is aware of the potential for addiction and dependence.      Orders:  -     Testosterone, Free, Total; Future    3. Reactive depression  Assessment & Plan:  Patient's depression is recurrent and is moderate without psychosis. Their depression is currently in partial remission and the condition is improving with treatment. This will be reassessed in 3 months. F/U as described:patient will continue current medication therapy   Continue Wellbutrin to 300 mg daily.      4. Overweight with body mass index (BMI) of 26 to 26.9 in adult  -     Hemoglobin A1c; Future  -     TSH Rfx On Abnormal To Free T4; Future        Assessment & Plan  1. Weight management.  His blood pressure has shown improvement, with a previous reading of 140/84 now reduced to 130 systolic. His BMI has also decreased from 31.3 to 26.5 since September 2024. He has successfully lost 33 pounds and is currently at a weight of 183 pounds. A target weight of 175 pounds has been set, which is deemed appropriate for his height and overall health status.    2. Sleep disturbance.  He has been advised to continue his current sleep hygiene practices, including the use of melatonin 5 mg as needed. The use of Unisom has been recommended for occasional use if he needs to get through an entire night of sleep. He has been advised against the use of Benadryl for sleep due to potential side effects.      Discussed possible differential diagnoses, testing, treatment, recommended non-pharmacological  interventions, risks, warning signs to monitor for that would indicate need for follow-up in clinic or ER. If no improvement with these regimens or you have new or worsening symptoms follow-up. Patient verbalizes understanding and agreement with plan of care. Denies further needs or concerns.     Patient was given instructions and counseling regarding her condition and for health maintenance advised.         Health Maintenance  Health Maintenance:   Health Maintenance Due   Topic Date Due    Pneumococcal Vaccine 50+ (1 of 1 - PCV) Never done    COVID-19 Vaccine (6 - 2024-25 season) 09/01/2024        Meds ordered during this visit  No orders of the defined types were placed in this encounter.      Meds stopped during this visit:  There are no discontinued medications.     Visit Diagnoses    ICD-10-CM ICD-9-CM   1. Mixed hyperlipidemia  E78.2 272.2   2. Testicular hypofunction  E29.1 257.2   3. Reactive depression  F32.9 300.4   4. Overweight with body mass index (BMI) of 26 to 26.9 in adult  E66.3 278.02    Z68.26 V85.22       Patient was given instructions and counseling regarding his condition or for health maintenance advice. Please see specific information pulled into the AVS if appropriate.     Follow Up   Return in about 3 months (around 5/25/2025) for followup HLD, Depression, Testosterone .      Patient or patient representative verbalized consent for the use of Ambient Listening during the visit with  Renu Barboza DO for chart documentation. 2/25/2025  11:53 EST      This document has been electronically signed by Renu Barboza DO   February 25, 2025 13:05 EST    Dictated Utilizing Dragon Dictation: Part of this note may be an electronic transcription/translation of spoken language to printed text using the Dragon Dictation System.    Renu Barboza D.O.  [facility] [place]

## 2025-02-26 LAB
HBA1C MFR BLD: 5.5 % (ref 4.8–5.6)
TSH SERPL DL<=0.05 MIU/L-ACNC: 2 UIU/ML (ref 0.27–4.2)

## 2025-03-02 LAB
TESTOST FREE SERPL-MCNC: 8.8 PG/ML (ref 6.6–18.1)
TESTOST SERPL-MCNC: 792 NG/DL (ref 264–916)

## 2025-03-14 DIAGNOSIS — N52.9 ERECTILE DYSFUNCTION, UNSPECIFIED ERECTILE DYSFUNCTION TYPE: ICD-10-CM

## 2025-03-14 DIAGNOSIS — E29.1 TESTICULAR HYPOFUNCTION: ICD-10-CM

## 2025-03-14 DIAGNOSIS — F32.9 REACTIVE DEPRESSION: ICD-10-CM

## 2025-03-17 RX ORDER — BUPROPION HYDROCHLORIDE 300 MG/1
300 TABLET ORAL DAILY
Qty: 90 TABLET | Refills: 1 | Status: SHIPPED | OUTPATIENT
Start: 2025-03-17

## 2025-03-17 RX ORDER — TESTOSTERONE CYPIONATE 200 MG/ML
200 VIAL (ML) INTRAMUSCULAR
Qty: 2 ML | Refills: 2 | Status: SHIPPED | OUTPATIENT
Start: 2025-03-17

## 2025-03-17 RX ORDER — SILDENAFIL 50 MG/1
50 TABLET, FILM COATED ORAL DAILY PRN
Qty: 30 TABLET | Refills: 0 | Status: SHIPPED | OUTPATIENT
Start: 2025-03-17

## 2025-04-17 DIAGNOSIS — E29.1 TESTICULAR HYPOFUNCTION: ICD-10-CM

## 2025-04-18 RX ORDER — TESTOSTERONE CYPIONATE 200 MG/ML
200 VIAL (ML) INTRAMUSCULAR
Qty: 2 ML | Refills: 2 | Status: SHIPPED | OUTPATIENT
Start: 2025-04-18

## 2025-05-19 DIAGNOSIS — E29.1 TESTICULAR HYPOFUNCTION: ICD-10-CM

## 2025-05-19 RX ORDER — TESTOSTERONE CYPIONATE 200 MG/ML
200 VIAL (ML) INTRAMUSCULAR
Qty: 2 ML | Refills: 2 | Status: SHIPPED | OUTPATIENT
Start: 2025-05-19

## 2025-06-06 ENCOUNTER — OFFICE VISIT (OUTPATIENT)
Dept: FAMILY MEDICINE CLINIC | Facility: CLINIC | Age: 60
End: 2025-06-06
Payer: COMMERCIAL

## 2025-06-06 VITALS
DIASTOLIC BLOOD PRESSURE: 80 MMHG | WEIGHT: 196 LBS | TEMPERATURE: 98.6 F | BODY MASS INDEX: 28.06 KG/M2 | HEART RATE: 80 BPM | SYSTOLIC BLOOD PRESSURE: 122 MMHG | HEIGHT: 70 IN

## 2025-06-06 DIAGNOSIS — N52.9 ERECTILE DYSFUNCTION, UNSPECIFIED ERECTILE DYSFUNCTION TYPE: ICD-10-CM

## 2025-06-06 DIAGNOSIS — E66.3 OVERWEIGHT WITH BODY MASS INDEX (BMI) OF 28 TO 28.9 IN ADULT: ICD-10-CM

## 2025-06-06 DIAGNOSIS — E29.1 TESTICULAR HYPOFUNCTION: ICD-10-CM

## 2025-06-06 DIAGNOSIS — E78.2 MIXED HYPERLIPIDEMIA: ICD-10-CM

## 2025-06-06 DIAGNOSIS — M25.522 LEFT ELBOW PAIN: Primary | ICD-10-CM

## 2025-06-06 DIAGNOSIS — F32.9 REACTIVE DEPRESSION: ICD-10-CM

## 2025-06-06 RX ORDER — SILDENAFIL 50 MG/1
50 TABLET, FILM COATED ORAL DAILY PRN
Qty: 30 TABLET | Refills: 0 | Status: SHIPPED | OUTPATIENT
Start: 2025-06-06

## 2025-06-06 RX ORDER — TESTOSTERONE CYPIONATE 200 MG/ML
200 VIAL (ML) INTRAMUSCULAR
Qty: 2 ML | Refills: 2 | Status: SHIPPED | OUTPATIENT
Start: 2025-06-06

## 2025-06-06 RX ORDER — ETODOLAC 500 MG/1
TABLET, FILM COATED ORAL SEE ADMIN INSTRUCTIONS
COMMUNITY
Start: 2025-04-02

## 2025-06-06 RX ORDER — BUPROPION HYDROCHLORIDE 300 MG/1
300 TABLET ORAL DAILY
Qty: 90 TABLET | Refills: 1 | Status: SHIPPED | OUTPATIENT
Start: 2025-06-06

## 2025-06-06 NOTE — PROGRESS NOTES
Subjective     Chief Complaint  Arthritis (Prior provider is no longer in network)    Subjective          Irving Nguyễn II is a 60 y.o. male who presents today to [facility] for follow up.    HPI:   History of Present Illness    History of Present Illness  The patient is a 60-year-old male who presents today for a follow-up visit.    He has been experiencing pain in his elbow joint, which he has attempted to manage with ice, heat, and Advil. He has also received two corticosteroid injections, administered approximately six months apart. However, his new insurance policy does not cover these injections. He is considering seeking treatment from another orthopedic specialist or sports medicine doctor. He is willing to pay out of pocket if necessary. He had a rotator cuff tear and underwent surgery in 2014.    He reports a slight increase in weight over the past two months, following a period of weight loss. His current weight is 196 pounds. He did not experience any side effects from the medication. He notes that the medication effectively suppressed his appetite and food cravings, but these symptoms returned upon discontinuation of the medication. Despite maintaining a regular exercise regimen and a healthy diet, he has noticed an increase in his food intake. He is currently on Wellbutrin for depression and is considering adding naloxone to his regimen. He was on Wegovy and lost about 33 pounds    He reports no side effects from his testosterone therapy. He notes that his energy levels were higher when he weighed 13 pounds less. He continues to attend the gym five times a week and maintains a positive outlook.    PAST SURGICAL HISTORY:  - Rotator cuff surgery in 2014      The following portions of the patient's history were reviewed and updated as appropriate: allergies, current medications, past family history, past medical history, past social history, past surgical history  "and problem list.    Objective     Objective     Allergy:   No Known Allergies     Current Medications:   Current Outpatient Medications   Medication Sig Dispense Refill    buPROPion XL (Wellbutrin XL) 300 MG 24 hr tablet Take 1 tablet by mouth Daily. 90 tablet 1    sildenafil (Viagra) 50 MG tablet Take 1 tablet by mouth Daily As Needed for Erectile Dysfunction. 30 tablet 0    Sure Comfort Insulin Syringe 31G X 5/16\" 0.5 ML misc See Admin Instructions.      Testosterone Cypionate 200 MG/ML solution Inject 1 mL as directed Every 14 (Fourteen) Days. 2 mL 2     No current facility-administered medications for this visit.       Past Medical History:  Past Medical History:   Diagnosis Date    Cancer     Skin    Depression     Erectile dysfunction     Obesity        Past Surgical History:  Past Surgical History:   Procedure Laterality Date    ROTATOR CUFF REPAIR  2017    SQUAMOUS CELL CARCINOMA EXCISION  ,     VASECTOMY         Social History:  Social History     Socioeconomic History    Marital status:    Tobacco Use    Smoking status: Former     Current packs/day: 0.00     Average packs/day: 0.5 packs/day for 34.6 years (17.3 ttl pk-yrs)     Types: Cigarettes, Cigars     Start date: 1984     Quit date: 2018     Years since quittin.8     Passive exposure: Never    Smokeless tobacco: Never    Tobacco comments:     No nicotine since    Vaping Use    Vaping status: Never Used   Substance and Sexual Activity    Alcohol use: No    Drug use: No    Sexual activity: Yes     Partners: Female     Birth control/protection: Vasectomy     Comment:        Family History:  Family History   Problem Relation Age of Onset    Obesity Mother     Depression Mother     No Known Problems Brother     No Known Problems Daughter        Vital Signs:   /80   Pulse 80   Temp 98.6 °F (37 °C) (Infrared)   Ht 177 cm (69.69\")   Wt 88.9 kg (196 lb)   BMI 28.38 kg/m²      Physical Exam:  Physical " Exam  Vitals reviewed.   Constitutional:       Appearance: He is not ill-appearing.   Cardiovascular:      Rate and Rhythm: Normal rate.      Pulses: Normal pulses.   Pulmonary:      Effort: Pulmonary effort is normal.      Breath sounds: Normal breath sounds.   Neurological:      General: No focal deficit present.      Mental Status: He is alert. Mental status is at baseline.   Psychiatric:         Mood and Affect: Mood normal.         Behavior: Behavior normal.         Thought Content: Thought content normal.         Judgment: Judgment normal.             PHQ-9 Score  PHQ-9 Total Score:      Lab Review  No visits with results within 3 Month(s) from this visit.   Latest known visit with results is:   Lab on 02/25/2025   Component Date Value Ref Range Status    Glucose 02/25/2025 79  65 - 99 mg/dL Final    BUN 02/25/2025 12  8 - 23 mg/dL Final    Creatinine 02/25/2025 1.17  0.76 - 1.27 mg/dL Final    Sodium 02/25/2025 138  136 - 145 mmol/L Final    Potassium 02/25/2025 4.4  3.5 - 5.2 mmol/L Final    Chloride 02/25/2025 102  98 - 107 mmol/L Final    CO2 02/25/2025 24.2  22.0 - 29.0 mmol/L Final    Calcium 02/25/2025 10.0  8.6 - 10.5 mg/dL Final    Total Protein 02/25/2025 7.5  6.0 - 8.5 g/dL Final    Albumin 02/25/2025 4.1  3.5 - 5.2 g/dL Final    ALT (SGPT) 02/25/2025 21  1 - 41 U/L Final    AST (SGOT) 02/25/2025 28  1 - 40 U/L Final    Alkaline Phosphatase 02/25/2025 50  39 - 117 U/L Final    Total Bilirubin 02/25/2025 0.3  0.0 - 1.2 mg/dL Final    Globulin 02/25/2025 3.4  gm/dL Final    A/G Ratio 02/25/2025 1.2  g/dL Final    BUN/Creatinine Ratio 02/25/2025 10.3  7.0 - 25.0 Final    Anion Gap 02/25/2025 11.8  5.0 - 15.0 mmol/L Final    eGFR 02/25/2025 70.9  >60.0 mL/min/1.73 Final    Hemoglobin A1C 02/25/2025 5.50  4.80 - 5.60 % Final    Total Cholesterol 02/25/2025 203 (H)  0 - 200 mg/dL Final    Triglycerides 02/25/2025 82  0 - 150 mg/dL Final    HDL Cholesterol 02/25/2025 54  40 - 60 mg/dL Final    LDL  Cholesterol  02/25/2025 134 (H)  0 - 100 mg/dL Final    VLDL Cholesterol 02/25/2025 15  5 - 40 mg/dL Final    LDL/HDL Ratio 02/25/2025 2.46   Final    TSH 02/25/2025 2.000  0.270 - 4.200 uIU/mL Final    Testosterone, Total 02/25/2025 792  264 - 916 ng/dL Final    Adult male reference interval is based on a population of  healthy nonobese males (BMI <30) between 19 and 39 years old.  Sussy et.al. JCEM 2017,102;5468-2057. PMID: 90141071.    Testosterone, Free 02/25/2025 8.8  6.6 - 18.1 pg/mL Final    WBC 02/25/2025 6.10  3.40 - 10.80 10*3/mm3 Final    RBC 02/25/2025 4.94  4.14 - 5.80 10*6/mm3 Final    Hemoglobin 02/25/2025 15.3  13.0 - 17.7 g/dL Final    Hematocrit 02/25/2025 43.5  37.5 - 51.0 % Final    MCV 02/25/2025 88.1  79.0 - 97.0 fL Final    MCH 02/25/2025 31.0  26.6 - 33.0 pg Final    MCHC 02/25/2025 35.2  31.5 - 35.7 g/dL Final    RDW 02/25/2025 13.6  12.3 - 15.4 % Final    RDW-SD 02/25/2025 43.4  37.0 - 54.0 fl Final    MPV 02/25/2025 10.6  6.0 - 12.0 fL Final    Platelets 02/25/2025 288  140 - 450 10*3/mm3 Final    Neutrophil % 02/25/2025 61.7  42.7 - 76.0 % Final    Lymphocyte % 02/25/2025 25.4  19.6 - 45.3 % Final    Monocyte % 02/25/2025 11.1  5.0 - 12.0 % Final    Eosinophil % 02/25/2025 1.0  0.3 - 6.2 % Final    Basophil % 02/25/2025 0.5  0.0 - 1.5 % Final    Immature Grans % 02/25/2025 0.3  0.0 - 0.5 % Final    Neutrophils, Absolute 02/25/2025 3.76  1.70 - 7.00 10*3/mm3 Final    Lymphocytes, Absolute 02/25/2025 1.55  0.70 - 3.10 10*3/mm3 Final    Monocytes, Absolute 02/25/2025 0.68  0.10 - 0.90 10*3/mm3 Final    Eosinophils, Absolute 02/25/2025 0.06  0.00 - 0.40 10*3/mm3 Final    Basophils, Absolute 02/25/2025 0.03  0.00 - 0.20 10*3/mm3 Final    Immature Grans, Absolute 02/25/2025 0.02  0.00 - 0.05 10*3/mm3 Final    nRBC 02/25/2025 0.0  0.0 - 0.2 /100 WBC Final        Radiology Results        Assessment / Plan         Assessment and Plan   Diagnoses and all orders for this visit:    1. Left elbow  pain (Primary)  -     Ambulatory Referral to Orthopedic Surgery    2. Overweight with body mass index (BMI) of 28 to 28.9 in adult    3. Testicular hypofunction  -     Testosterone Cypionate 200 MG/ML solution; Inject 1 mL as directed Every 14 (Fourteen) Days.  Dispense: 2 mL; Refill: 2    4. Reactive depression  -     buPROPion XL (Wellbutrin XL) 300 MG 24 hr tablet; Take 1 tablet by mouth Daily.  Dispense: 90 tablet; Refill: 1    5. Erectile dysfunction, unspecified erectile dysfunction type  -     sildenafil (Viagra) 50 MG tablet; Take 1 tablet by mouth Daily As Needed for Erectile Dysfunction.  Dispense: 30 tablet; Refill: 0    6. Mixed hyperlipidemia        Assessment & Plan  1. Elbow pain.  - Persistent elbow pain reported, previously managed with corticosteroid injections.  - Referral to an orthopedic specialist will be initiated for further evaluation and management.  - Advised to consider turmeric supplements at a dosage of 500 mg twice daily, taken with food, to alleviate inflammation.  - If necessary, may use naproxen or Aleve sparingly due to previous kidney function concerns.    2. Weight management.  - Experienced weight fluctuations, currently at 196 pounds, previously lost about 33 pounds in 02/2025 while on Wegovy.  - Advised to consider enrolling in a weight management clinic for comprehensive care.  - Discussed the option of adding naloxone to his current Wellbutrin regimen, essentially creating Contrave, as a potential cost-effective alternative.  - Will research this option and provide feedback.    3. Testosterone therapy.  - Testosterone levels are within the normal range, no side effects reported from the therapy.  - No changes to current testosterone therapy are necessary at this time.    Follow-up  - Follow-up in 3 months.      Discussed possible differential diagnoses, testing, treatment, recommended non-pharmacological interventions, risks, warning signs to monitor for that would indicate  need for follow-up in clinic or ER. If no improvement with these regimens or you have new or worsening symptoms follow-up. Patient verbalizes understanding and agreement with plan of care. Denies further needs or concerns.     Patient was given instructions and counseling regarding her condition and for health maintenance advised.            BMI is >= 25 and <30. (Overweight) The following options were offered after discussion;: weight loss educational material (shared in after visit summary)           Health Maintenance  Health Maintenance:   Health Maintenance Due   Topic Date Due    Pneumococcal Vaccine 50+ (1 of 1 - PCV) Never done        Meds ordered during this visit  New Medications Ordered This Visit   Medications    Testosterone Cypionate 200 MG/ML solution     Sig: Inject 1 mL as directed Every 14 (Fourteen) Days.     Dispense:  2 mL     Refill:  2    buPROPion XL (Wellbutrin XL) 300 MG 24 hr tablet     Sig: Take 1 tablet by mouth Daily.     Dispense:  90 tablet     Refill:  1    sildenafil (Viagra) 50 MG tablet     Sig: Take 1 tablet by mouth Daily As Needed for Erectile Dysfunction.     Dispense:  30 tablet     Refill:  0       Meds stopped during this visit:  Medications Discontinued During This Encounter   Medication Reason    Semaglutide-Weight Management 1.7 MG/0.75ML solution auto-injector     sildenafil (Viagra) 50 MG tablet Reorder    buPROPion XL (Wellbutrin XL) 300 MG 24 hr tablet Reorder    Testosterone Cypionate 200 MG/ML solution Reorder        Visit Diagnoses    ICD-10-CM ICD-9-CM   1. Left elbow pain  M25.522 719.42   2. Overweight with body mass index (BMI) of 28 to 28.9 in adult  E66.3 278.02    Z68.28 V85.24   3. Testicular hypofunction  E29.1 257.2   4. Reactive depression  F32.9 300.4   5. Erectile dysfunction, unspecified erectile dysfunction type  N52.9 607.84   6. Mixed hyperlipidemia  E78.2 272.2       Patient was given instructions and counseling regarding his condition or for  health maintenance advice. Please see specific information pulled into the AVS if appropriate.     Follow Up   Return in about 3 months (around 9/6/2025) for followup MARY, Jayson GUTIERREZ .      Patient or patient representative verbalized consent for the use of Ambient Listening during the visit with  Renu Barboza DO for chart documentation. 6/6/2025  09:03 EDT      This document has been electronically signed by Renu Barboza DO   June 6, 2025 09:20 EDT    Dictated Utilizing Dragon Dictation: Part of this note may be an electronic transcription/translation of spoken language to printed text using the Dragon Dictation System.    Renu Barboza D.O.  [facility] [place]

## 2025-06-18 ENCOUNTER — OFFICE VISIT (OUTPATIENT)
Dept: ORTHOPEDIC SURGERY | Facility: CLINIC | Age: 60
End: 2025-06-18
Payer: COMMERCIAL

## 2025-06-18 VITALS
BODY MASS INDEX: 28.06 KG/M2 | HEIGHT: 70 IN | DIASTOLIC BLOOD PRESSURE: 84 MMHG | SYSTOLIC BLOOD PRESSURE: 138 MMHG | WEIGHT: 195.99 LBS

## 2025-06-18 DIAGNOSIS — M77.12 LEFT LATERAL EPICONDYLITIS: ICD-10-CM

## 2025-06-18 DIAGNOSIS — M25.522 LEFT ELBOW PAIN: Primary | ICD-10-CM

## 2025-06-18 RX ADMIN — LIDOCAINE HYDROCHLORIDE 1 ML: 10 INJECTION, SOLUTION EPIDURAL; INFILTRATION; INTRACAUDAL; PERINEURAL at 10:57

## 2025-06-18 RX ADMIN — TRIAMCINOLONE ACETONIDE 40 MG: 40 INJECTION, SUSPENSION INTRA-ARTICULAR; INTRAMUSCULAR at 10:57

## 2025-06-18 NOTE — PROGRESS NOTES
[facility] Office [facility] - Milana Montes De Oca PA-C    Office Visit       Patient Name: Irving Nguyễn II    Chief Complaint:   Chief Complaint   Patient presents with    Left Elbow - Pain       Referring Physician: Renu Barboza DO    History of Present Illness:   Irving Nguyễn II is a very pleasant 60 y.o. male who presents  to discuss his left elbow pain.  He reports pain and decreased motion for several years.  He is unable to supinate his forearm completely.  He has pain with working out and lifting.  He has been seeing Dr. Del Cid for years both for his shoulder and elbow.  He is s/p cuff repair and is doing well.  He has treated the elbow with injection every 6 months and continued at home exercises.  Insurance changed and he can no longer see Dr. Del Cid so transfers care here      Subjective     Review of Systems   Constitutional: Negative.  Negative for chills, fatigue and fever.   HENT: Negative.  Negative for congestion and dental problem.    Eyes: Negative.  Negative for blurred vision.   Respiratory: Negative.  Negative for shortness of breath.    Cardiovascular: Negative.  Negative for leg swelling.   Gastrointestinal: Negative.  Negative for abdominal pain.   Endocrine: Negative.  Negative for polyuria.   Genitourinary: Negative.  Negative for difficulty urinating.   Musculoskeletal:  Positive for arthralgias.   Skin: Negative.    Allergic/Immunologic: Negative.    Neurological: Negative.    Hematological: Negative.  Negative for adenopathy.   Psychiatric/Behavioral: Negative.  Negative for behavioral problems.         Past Medical History:   Past Medical History:   Diagnosis Date    Cancer     Skin    Depression     Erectile dysfunction     Obesity     Rotator cuff syndrome 2017    Surgery    Tennis elbow 2010       Past Surgical History:   Past Surgical History:   Procedure Laterality Date    ROTATOR CUFF REPAIR  11/2017    SHOULDER SURGERY   "2017    SQUAMOUS CELL CARCINOMA EXCISION  ,     VASECTOMY         Family History:   Family History   Problem Relation Age of Onset    Obesity Mother     Depression Mother     No Known Problems Brother     No Known Problems Daughter        Social History:   Social History     Socioeconomic History    Marital status:    Tobacco Use    Smoking status: Former     Current packs/day: 0.00     Average packs/day: 0.5 packs/day for 34.6 years (17.3 ttl pk-yrs)     Types: Cigarettes, Cigars     Start date: 1984     Quit date: 2018     Years since quittin.8     Passive exposure: Never    Smokeless tobacco: Never    Tobacco comments:     No nicotine since    Vaping Use    Vaping status: Never Used   Substance and Sexual Activity    Alcohol use: No    Drug use: No    Sexual activity: Yes     Partners: Female     Birth control/protection: Vasectomy     Comment:        Medications:   Current Outpatient Medications:     buPROPion XL (Wellbutrin XL) 300 MG 24 hr tablet, Take 1 tablet by mouth Daily., Disp: 90 tablet, Rfl: 1    sildenafil (Viagra) 50 MG tablet, Take 1 tablet by mouth Daily As Needed for Erectile Dysfunction., Disp: 30 tablet, Rfl: 0    Testosterone Cypionate 200 MG/ML solution, Inject 1 mL as directed Every 14 (Fourteen) Days., Disp: 2 mL, Rfl: 2    Allergies: No Known Allergies    I have reviewed and updated the following portions of the patient's history and review of systems: allergies, current medications, past family history, past medical history, past social history, past surgical history and problem list.    Objective      Vital Signs:   Vitals:    25 1012   BP: 138/84   Weight: 88.9 kg (195 lb 15.8 oz)   Height: 177 cm (69.69\")       Ortho Exam:  Left elbow exam:  tender over the lateral epicondyle.  Mild pain with resisted wrist extension.  Flexion 140, lacking 20degrees extension, 45 degrees supination, 90 pronation.  Able to make a composite fist.    Results " Review:   XR Elbow 3+ View Left  Left Elbow Radiographs  Indication: left elbow pain  Views: AP, lateral, and oblique views of the left elbow    Comparison: no prior studies available for review    Findings:  No obvious acute bony abnormalities, with alignment on the lateral view,   but suboptimal imaging noted on the AP view secondary to patient   positioning.         Assessment / Plan      Assessment:  Diagnoses and all orders for this visit:    1. Left elbow pain (Primary)  -     Cancel: XR Elbow 3+ View Left    2. Left lateral epicondylitis    Other orders  -     - Hand/Upper Extremity Injection: L elbow        Quality Metrics:   BMI:   BMI is >= 25 and <30. (Overweight) The following options were offered after discussion;: Information on healthy weight added to patient's after visit summary.       Tobacco:   Irving Nguyễn II  reports that he quit smoking about 6 years ago. His smoking use included cigarettes and cigars. He started smoking about 41 years ago. He has a 17.3 pack-year smoking history. He has never been exposed to tobacco smoke. He has never used smokeless tobacco.      Plan:   Left lateral epicondylitis.  I reviewed today's xrays, outside records, clinical findings with the patient.  He has had decreased motion of the elbow for several years.  He has done at home exercises and was getting intermittent injection with Dr. Del Cid.  He has some degenerative changes on xray, but not an optimal study AP.   Plan today is cortisone injection at the lateral epicondyle.  She will continue with the at home exericses.  She will return as needed.    I discussed with the patient the potential benefits of performing a therapeutic injection of the left elbow extensor tendon sheath as well as potential risks including but not limited to infection, swelling, pain, bleeding, bruising, nerve/vessel damage, skin color changes, transient elevation in blood glucose levels, and fat atrophy. After informed consent and  verifying correct patient, procedure site, and type of procedure, the area was prepped with Hibiclens, ethyl chloride was used to numb the skin. Via the  lateral approach, 1cc of 1% lidocaine and  40mg/ml of Kenalog were injected into the left elbow extensor tendon sheath. The patient tolerated the procedure well. There were no complications.             Milana Montes De Oca PA-C  [facility]       Dictated using Dragon Speech Recognition.

## 2025-06-18 NOTE — PROGRESS NOTES
Procedure   - Hand/Upper Extremity Injection: L elbow for lateral epicondylitis on 6/18/2025 10:57 AM  Indications: pain  Details: 25 G needle, lateral approach  Medications: 1 mL lidocaine PF 1% 1 %; 40 mg triamcinolone acetonide 40 MG/ML  Outcome: tolerated well, no immediate complications  Procedure, treatment alternatives, risks and benefits explained, specific risks discussed. Consent was given by the patient. Immediately prior to procedure a time out was called to verify the correct patient, procedure, equipment, support staff and site/side marked as required. Patient was prepped and draped in the usual sterile fashion.

## 2025-06-20 RX ORDER — LIDOCAINE HYDROCHLORIDE 10 MG/ML
1 INJECTION, SOLUTION EPIDURAL; INFILTRATION; INTRACAUDAL; PERINEURAL
Status: COMPLETED | OUTPATIENT
Start: 2025-06-18 | End: 2025-06-18

## 2025-06-20 RX ORDER — TRIAMCINOLONE ACETONIDE 40 MG/ML
40 INJECTION, SUSPENSION INTRA-ARTICULAR; INTRAMUSCULAR
Status: COMPLETED | OUTPATIENT
Start: 2025-06-18 | End: 2025-06-18

## 2025-06-30 ENCOUNTER — TELEMEDICINE (OUTPATIENT)
Dept: FAMILY MEDICINE CLINIC | Facility: TELEHEALTH | Age: 60
End: 2025-06-30
Payer: COMMERCIAL

## 2025-06-30 DIAGNOSIS — J02.9 ACUTE PHARYNGITIS, UNSPECIFIED ETIOLOGY: Primary | ICD-10-CM

## 2025-06-30 PROCEDURE — 99213 OFFICE O/P EST LOW 20 MIN: CPT | Performed by: NURSE PRACTITIONER

## 2025-06-30 RX ORDER — AMOXICILLIN 875 MG/1
875 TABLET, COATED ORAL 2 TIMES DAILY
Qty: 20 TABLET | Refills: 0 | Status: SHIPPED | OUTPATIENT
Start: 2025-06-30 | End: 2025-07-10

## 2025-06-30 NOTE — PATIENT INSTRUCTIONS
Drink plenty of water  Over the counter pain relievers okay   If symptoms do not improve in 3-5 days follow up with your primary care provider or urgent care  If symptoms worsen follow up with urgent care or the emergency room         Pharyngitis    Pharyngitis is a sore throat (pharynx). This is when there is redness, pain, and swelling in your throat. Most of the time, this condition gets better on its own. In some cases, you may need medicine.  What are the causes?  An infection from a virus.  An infection from bacteria.  Allergies.  What increases the risk?  Being 5-24 years old.  Being in crowded environments. These include:  Daycares.  Schools.  Dormitories.  Living in a place with cold temperatures outside.  Having a weakened disease-fighting (immune) system.  What are the signs or symptoms?  Symptoms may vary depending on the cause. Common symptoms include:  Sore throat.  Tiredness (fatigue).  Low-grade fever.  Stuffy nose.  Cough.  Headache.  Other symptoms may include:  Glands in the neck (lymph nodes) that are swollen.  Skin rashes.  Film on the throat or tonsils. This can be caused by an infection from bacteria.  Vomiting.  Red, itchy eyes.  Loss of appetite.  Joint pain and muscle aches.  Tonsils that are temporarily bigger than usual (enlarged).  How is this treated?  Many times, treatment is not needed. This condition usually gets better in 3-4 days without treatment.  If the infection is caused by a bacteria, you may be need to take antibiotics.  Follow these instructions at home:  Medicines  Take over-the-counter and prescription medicines only as told by your doctor.  If you were prescribed an antibiotic medicine, take it as told by your doctor. Do not stop taking the antibiotic even if you start to feel better.  Use throat lozenges or sprays to soothe your throat as told by your doctor.  Children can get pharyngitis. Do not give your child aspirin.  Managing pain  To help with pain, try:  Sipping  warm liquids, such as:  Broth.  Herbal tea.  Warm water.  Eating or drinking cold or frozen liquids, such as frozen ice pops.  Rinsing your mouth (gargle) with a salt water mixture 3-4 times a day or as needed.  To make salt water, dissolve ½-1 tsp (3-6 g) of salt in 1 cup (237 mL) of warm water.  Do not swallow this mixture.  Sucking on hard candy or throat lozenges.  Putting a cool-mist humidifier in your bedroom at night to moisten the air.  Sitting in the bathroom with the door closed for 5-10 minutes while you run hot water in the shower.     General instructions    Do not smoke or use any products that contain nicotine or tobacco. If you need help quitting, ask your doctor.  Rest as told by your doctor.  Drink enough fluid to keep your pee (urine) pale yellow.  How is this prevented?  Wash your hands often for at least 20 seconds with soap and water. If soap and water are not available, use hand .  Do not touch your eyes, nose, or mouth with unwashed hands. Wash hands after touching these areas.  Do not share cups or eating utensils.  Avoid close contact with people who are sick.  Contact a doctor if:  You have large, tender lumps in your neck.  You have a rash.  You cough up green, yellow-brown, or bloody spit.  Get help right away if:  You have a stiff neck.  You drool or cannot swallow liquids.  You cannot drink or take medicines without vomiting.  You have very bad pain that does not go away with medicine.  You have problems breathing, and it is not from a stuffy nose.  You have new pain and swelling in your knees, ankles, wrists, or elbows.  These symptoms may be an emergency. Get help right away. Call your local emergency services (911 in the [place]  Do not wait to see if the symptoms will go away.  Do not drive yourself to the hospital.  Summary  Pharyngitis is a sore throat (pharynx). This is when there is redness, pain, and swelling in your throat.  Most of the time, pharyngitis gets better  on its own. Sometimes, you may need medicine.  If you were prescribed an antibiotic medicine, take it as told by your doctor. Do not stop taking the antibiotic even if you start to feel better.  This information is not intended to replace advice given to you by your health care provider. Make sure you discuss any questions you have with your health care provider.  Document Revised: 03/16/2022 Document Reviewed: 03/16/2022  Elsevier Patient Education © 2024 [employer]

## 2025-06-30 NOTE — PROGRESS NOTES
"CHIEF COMPLAINT  Chief Complaint   Patient presents with    Sore Throat         HPI  Irving Nguyễn II is a 60 y.o. male  presents with complaint of \"sudden onset of sore throat last night.\" He has been using spray and lozenges. Temp 99.3, oxygen 98% and HR 82 bpm. He reports white patches on his Uvula.     Review of Systems   Constitutional:  Positive for chills, fatigue and fever. Negative for diaphoresis.   HENT:  Positive for sore throat. Negative for congestion, postnasal drip, rhinorrhea, sinus pressure, sinus pain and sneezing.    Respiratory:  Negative for cough.    Gastrointestinal:  Negative for diarrhea, nausea and vomiting.   Musculoskeletal:  Negative for myalgias.   Neurological:  Positive for headaches.       Past Medical History:   Diagnosis Date    Cancer     Skin    Depression     Erectile dysfunction     Obesity     Rotator cuff syndrome 2017    Surgery    Tennis elbow 2010       Family History   Problem Relation Age of Onset    Obesity Mother     Depression Mother     No Known Problems Brother     No Known Problems Daughter        Social History     Socioeconomic History    Marital status:    Tobacco Use    Smoking status: Former     Current packs/day: 0.00     Average packs/day: 0.5 packs/day for 34.6 years (17.3 ttl pk-yrs)     Types: Cigarettes, Cigars     Start date: 1984     Quit date: 2018     Years since quittin.9     Passive exposure: Never    Smokeless tobacco: Never    Tobacco comments:     No nicotine since 2017   Vaping Use    Vaping status: Never Used   Substance and Sexual Activity    Alcohol use: No    Drug use: No    Sexual activity: Yes     Partners: Female     Birth control/protection: Vasectomy     Comment:          There were no vitals taken for this visit.    PHYSICAL EXAM  Physical Exam   Constitutional: He is oriented to person, place, and time. He appears well-developed and well-nourished. He does not have a sickly appearance. He does not appear " ill. No distress.   HENT:   Head: Normocephalic and atraumatic.   Mouth/Throat: Mouth/Lips are normal.Uvula is midline and oropharynx is clear and moist. Mucous membranes are not pale, not dry, not cyanotic and erythematous. No tonsillar exudate. white patches.blistered.  Eyes: EOM are normal.   Neck: Neck normal appearance.  Pulmonary/Chest: Effort normal.  No respiratory distress.  Neurological: He is alert and oriented to person, place, and time.   Skin: Skin is dry.   Psychiatric: He has a normal mood and affect.           Diagnoses and all orders for this visit:    1. Acute pharyngitis, unspecified etiology (Primary)    Other orders  -     amoxicillin (AMOXIL) 875 MG tablet; Take 1 tablet by mouth 2 (Two) Times a Day for 10 days.  Dispense: 20 tablet; Refill: 0        Mode of visit: Video   Myself and Irving Nguyễn II participated in this visit. The patient is located in [address] [place] [address]. I am located in [place], [place]. Mychart and Twilio were utilized.   You have chosen to receive care through a telehealth visit.     Does the patient consent to use a video/audio connection for your medical care today? Yes       Note Disclaimer: At [facility], we believe that sharing information builds trust and better   relationships. You are receiving this note because you recently visited [facility]. It is possible you   will see health information before a provider has talked with you about it. This kind of information can   be easy to misunderstand. To help you fully understand what it means for your health, we urge you to   discuss this note with your provider.    ALEISHA Young  06/30/2025  09:43 EDT